# Patient Record
Sex: MALE | Race: WHITE | NOT HISPANIC OR LATINO | Employment: OTHER | URBAN - METROPOLITAN AREA
[De-identification: names, ages, dates, MRNs, and addresses within clinical notes are randomized per-mention and may not be internally consistent; named-entity substitution may affect disease eponyms.]

---

## 2023-08-30 ENCOUNTER — HOSPITAL ENCOUNTER (INPATIENT)
Facility: HOSPITAL | Age: 83
LOS: 2 days | Discharge: HOME | DRG: 057 | End: 2023-09-01
Attending: PHYSICAL MEDICINE & REHABILITATION | Admitting: PHYSICAL MEDICINE & REHABILITATION
Payer: MEDICARE

## 2023-08-30 DIAGNOSIS — I63.512 ACUTE ISCHEMIC LEFT MCA STROKE: Primary | ICD-10-CM

## 2023-08-30 RX ORDER — ASPIRIN 81 MG/1
81 TABLET, CHEWABLE ORAL DAILY
COMMUNITY

## 2023-08-30 RX ORDER — ALPRAZOLAM 0.5 MG/1
0.5 TABLET ORAL DAILY PRN
COMMUNITY

## 2023-08-30 RX ORDER — FUROSEMIDE 40 MG/1
40 TABLET ORAL DAILY
COMMUNITY

## 2023-08-30 RX ORDER — ATORVASTATIN CALCIUM 40 MG/1
40 TABLET, FILM COATED ORAL DAILY
Status: ON HOLD | COMMUNITY
End: 2023-09-01 | Stop reason: SDUPTHER

## 2023-08-30 RX ORDER — CLOPIDOGREL BISULFATE 75 MG/1
75 TABLET ORAL
Status: DISCONTINUED | OUTPATIENT
Start: 2023-08-31 | End: 2023-09-01 | Stop reason: HOSPADM

## 2023-08-30 RX ORDER — DIPHENOXYLATE HYDROCHLORIDE AND ATROPINE SULFATE 2.5; .025 MG/1; MG/1
1 TABLET ORAL
Status: DISCONTINUED | OUTPATIENT
Start: 2023-08-31 | End: 2023-09-01 | Stop reason: HOSPADM

## 2023-08-30 RX ORDER — FUROSEMIDE 40 MG/1
40 TABLET ORAL
Status: DISCONTINUED | OUTPATIENT
Start: 2023-08-31 | End: 2023-09-01 | Stop reason: HOSPADM

## 2023-08-30 RX ORDER — ATORVASTATIN CALCIUM 80 MG/1
80 TABLET, FILM COATED ORAL
Status: DISCONTINUED | OUTPATIENT
Start: 2023-08-31 | End: 2023-09-01 | Stop reason: HOSPADM

## 2023-08-30 RX ORDER — ALPRAZOLAM 0.5 MG/1
0.25 TABLET ORAL NIGHTLY PRN
Status: DISCONTINUED | OUTPATIENT
Start: 2023-08-30 | End: 2023-09-01

## 2023-08-30 RX ORDER — AMLODIPINE BESYLATE 5 MG/1
5 TABLET ORAL
Status: DISCONTINUED | OUTPATIENT
Start: 2023-08-31 | End: 2023-09-01 | Stop reason: HOSPADM

## 2023-08-30 RX ORDER — CLOPIDOGREL BISULFATE 75 MG/1
75 TABLET ORAL DAILY
COMMUNITY

## 2023-08-30 RX ORDER — ASPIRIN 81 MG/1
81 TABLET ORAL
Status: DISCONTINUED | OUTPATIENT
Start: 2023-08-31 | End: 2023-09-01 | Stop reason: HOSPADM

## 2023-08-30 RX ORDER — GUAIFENESIN 200 MG/10ML
200 LIQUID ORAL EVERY 4 HOURS PRN
Status: DISCONTINUED | OUTPATIENT
Start: 2023-08-30 | End: 2023-09-01 | Stop reason: HOSPADM

## 2023-08-30 RX ADMIN — METOPROLOL TARTRATE 12.5 MG: 25 TABLET, FILM COATED ORAL at 21:21

## 2023-08-30 RX ADMIN — GUAIFENESIN 200 MG: 100 SOLUTION ORAL at 21:26

## 2023-08-30 RX ADMIN — ALPRAZOLAM 0.25 MG: 0.5 TABLET ORAL at 21:21

## 2023-08-30 NOTE — PROGRESS NOTES
Inpatient Rehabilitation Plan of Care Note    Plan of Care  Care Plan Reviewed - Updates as Follows    Sphincter Control    [RN] Bowel Management(Active)  Current Status(08/31/2023): continent of bowel 100 %  Weekly Goal(09/07/2023): continent 100 %  Discharge Goal: continent 100%    Signed by: Sergo Dotson RN

## 2023-08-30 NOTE — PLAN OF CARE
Goal Outcome Evaluation:  Plan of Care Reviewed With: patient, spouse, family        Progress: improving

## 2023-08-30 NOTE — DISCHARGE INSTRUCTIONS
30-day cardiac monitor at discharge through [facility]    - follow-up provider order for Dr. Eric Elizabeth-[facility]-for 30-day event monitor per discharging physicians from [facility].  - follow-up with Dr. Eric Joya-physical medicine and rehabilitation-as needed for any rehab issues.  -follow-up provider-[facility]    No driving.  No alcohol.  Patient will need to follow-up with [facility] regarding any clearance for driving    Outpatient speech therapy [facility]

## 2023-08-30 NOTE — PROGRESS NOTES
SECTION GG    Eating Performance: Patient completed the activities by themself with no  assistance from a helper.    Eating Discharge Goals: Patient completed the activities by themself with no  assistance from a helper.    Section B. Hearing and Vision  Ability to Hear:  Minimal difficulty - difficulty in some environments (e.g.,  when person speaks softly or setting is noisy)  Ability to See in Adequate Light:  Impaired - sees large print, but not regular  print in newspapers/books    Section B. Health Literacy  Frequency of Needing Assistance Reading:  Never    Section D. Mood  Presence of little interest or pleasure in doing things:   No  Frequency of having little interest or pleasure in doing things:   Never or 1  day  Presence of feeling down, depressed, or hopeless:   No  Frequency of feeling down, depressed, or hopeless:   Never or 1 day   Interview Ended. Above responses do not meet criteria to continue.  Total Severity Score:   0    Section D. Social Isolation  Frequecy of Feeling Lonely or Isolated:  Rarely    Section J. Health Conditions (Pain Effect on Sleep)  Pain Effect on Sleep:   Rarely or not at all    Signed by: Sergo Dotson RN

## 2023-08-30 NOTE — H&P
[facility]   HISTORY AND PHYSICAL    Patient Name: Colin Connor  : 1940  MRN: [id number]  Primary Care Physician:  Riki Isabel MD  Date of admission: 2023    Subjective   Subjective     Chief Complaint: Status post left frontal CVA    History of Present Illness  Patient is 82-year-old male who presented to [facility] with right-sided weakness and aphasia on 2023.  Also had right facial drooping.  Garbled speech.  Acute left internal carotid artery occlusion secondary to ruptured internal carotid artery plaque and left MCA thrombus and occlusion.  He received tPA.  He underwent emergent endovascular mechanical thrombectomy of the left internal carotid artery and middle cerebral artery and left carotid artery angioplasty and stenting.  He is to continue aspirin Plavix and statin for stroke prophylaxis.  For hypertension continued amlodipine and Lopressor.  Hyperlipidemia-high intensity statin.  Chronic diastolic congestive heart failure..  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  Continues on Lasix 40 mg daily.  History of aortic valve replacement  with perioperative atrial fibrillation.  Remained in normal sinus rhythm during his recent hospital stay.    He has had mild aphasia.  Moving his extremities.  With speech therapy-[Aphasia Diagnosis Mild expressive deficits;Mild receptive deficits   Apraxia Diagnosis Verbal apraxia;Mild apraxia   Dysarthria Diagnosis Mild dysarthria   Tolerance to Treatment Patient tolerated treatment well   Problem List Impaired Intelligibility/speech;Impaired communication;Impaired comprehension;Impaired language   Comment Patient tolerated treatment well. Improvement in expressive language for conversation and verbal descriptions this date. No initial phrase repetitions or difficulty with initiation observed. Semantic paraphasias x2 corrected with cues to make inference about scene. Perseveration of one item x1 from previous scene. Suspect visual scenes description  and reading accuracy impacted slightly by visual acuity changes from patient's macular degeneration. When paragraph presented in larger font phonemic errors were still noted. Required cues to slow rate of reading and focus on individual words to increase reading accuracy. Family and patient report overall improvement in articulation however slight reduction of articulatory precision still noted. Benefited from cues for over articulation when producing alliterative phrases. ]    With physical therapy-[Bed Mobility/Transfers   Sit to Stand Supervision/Standby assist   Stand to Sit Supervision/Standby assist   Technique Stand pivot   Trials/Comments 5xSTS: 17s, SBA     Ambulation   Ambulation Assistance Contact guard;Supervision/Standby assist;With cues   Quality of Gait Decreased ramses;Narrow base of support;Cues for increased hip/knee flexion;Cues for step through;Decreased arm swing   Loss of Balance (# of times) 1 times   Distance Ambulated (ft) -- 40' intervals, multiple reps, c focus on increased scanning, stops/starts and route-finding   Ambulation Safety Fair   Gait Comment TUs ]  With Occupational Therapy-[Bed Mobility/Transfers   Supine to Sit Supervision/Standby assist;With side rail/trapeze   Sit to Stand Supervision/Standby assist   Stand to Sit Supervision/Standby assist   Functional Mobility Supervision/Standby assist   Transfer Device -- gait belt   Trials/Comments Pt completing transfer from bed to chair, pt with mild unsteadiness     Balance   Static Sitting Good   Dynamic Sitting Good   Static Standing Fair   Dynamic Standing Fair ]    Given his functional impairments and comorbidities he is now admitted for acute inpatient rehabilitation    He denies any cognitive changes.  Has some slurring of his speech.  Denies any significant dysphagia.  Does have some cough, nonproductive.  No breathing complaints.  No abdominal complaints.  No bowel complaints.  No bladder complaints.  Denies any  premorbid functional limitations.  Was independent without a device.    Review of Systems     Personal History     Past Medical History:   Diagnosis Date    CHF (congestive heart failure)     Coronary artery disease     Elevated cholesterol     Hypertension     Sleep apnea     Stroke      Past Medical History:  Past Medical History:   Diagnosis Date    Anxiety    Atrial fibrillation    Benign essential HTN    CHF (congestive heart failure)    Coronary artery disease    Exercise intolerance   short of breath    GERD (gastroesophageal reflux disease)   no meds    Glaucoma    Kidney stones    Macular degeneration    Myocardial infarction 1991   stent placement    Nonsenile cataract   removed bilaterally    Sleep apnea    Stroke 2006   peripheral vision diminished left eye    Valvular heart disease    Wears dentures   full upper, partial bottom     Past Surgical History:  Past Surgical History:   Procedure Laterality Date    CARDIAC CATHETERIZATION    CATARACT EXTRACTION, BILATERAL    DRUG ELUTING STENT PLACEMENT 02/14/2014   Xience 4.0 x15, 4.0 x 18, 3.5 x 38, 3.0 x 38 in RCA    EYE SURGERY   Past Surgical History:   Procedure Laterality Date    CARDIAC CATHETERIZATION      CARDIAC SURGERY      EYE SURGERY         Family History: family history includes Cancer in his sister; Heart failure in his brother; Macular degeneration in his mother; No Known Problems in his daughter, maternal aunt, maternal grandfather, maternal grandmother, maternal uncle, paternal aunt, paternal grandfather, paternal grandmother, paternal uncle, son, and another family member. Otherwise pertinent FHx was reviewed and not pertinent to current issue.    Social History:  reports that he quit smoking about 32 years ago. His smoking use included cigarettes. He started smoking about 69 years ago. He has a 18.50 pack-year smoking history. He has never used smokeless tobacco. Alcohol use questions deferred to the physician. He reports that he does not  use drugs.  .  3 steps into the home.  Half bath on the first floor.  Full bath and bedroom on the second floor.  If needed he could set up a bed on the first floor.  Retired.  Home Medications:  ALPRAZolam, Multiple Vitamins-Minerals, aspirin, atorvastatin, clopidogrel, furosemide, and metoprolol tartrate    Allergies:  ACE inhibitor's-cough      Medications:  Scheduled Meds:[START ON 8/31/2023] amLODIPine, 5 mg, Oral, Q24H  [START ON 8/31/2023] aspirin, 81 mg, Oral, Q24H  [START ON 8/31/2023] atorvastatin, 80 mg, Oral, Q24H  [START ON 8/31/2023] clopidogrel, 75 mg, Oral, Q24H  [START ON 8/31/2023] furosemide, 40 mg, Oral, Q24H  metoprolol tartrate, 12.5 mg, Oral, Q12H  [START ON 8/31/2023] multivitamin, 1 tablet, Oral, Q24H      Continuous Infusions:   PRN Meds:.  ALPRAZolam    Objective    Objective     Vitals:   Temp:  [98.2 °F (36.8 °C)] 98.2 °F (36.8 °C)  Heart Rate:  [72] 72  Resp:  [18] 18  BP: (163)/(74) 163/74    Physical Exam    MENTAL STATUS -  AWAKE / ALERT  HEENT- NCAT, PUPILS EQUALLY ROUND, SCLERAE ANICTERIC, CONJUNCTIVAE PINK, OP MOIST, NO JVD, EARS UNREMARKABLE EXTERNALLY  LUNGS - CTA, NO WHEEZES, RALES OR RHONCHI  HEART- RRR with occasional skipped beat, NO RUB, MURMUR, OR GALLOP  ABD - NORMOACTIVE BOWEL SOUNDS, SOFT, NT. NO HEPATOSPLENOMEGALY APPRECIATED  EXT - NO EDEMA OR CYANOSIS  NEURO -oriented person place time and situation.  Able to do simple time management.  He had difficulty with simple money management.  Can spell his last name forward but not backward.  Speech was fluent  Recognizes finger movement all 4 quadrants with each eye tested individually.  Mild right facial droop  Dysarthria  MOTOR EXAM -patient resistance well with bilateral shoulder flexion, elbow flexion, left finger flexion with mild weakness with right finger flexion, and takes resistance with bilateral knee extension and ankle dorsiflexion.  Impaired dexterity bilaterally  Difficulty with rapid alternating  movements  Tone was normal      Result Review    Result Review:     Component  Ref Range & Units Today Comments   Sodium  136 - 145 mmol/L 141 (note)  Excess protein and/or lipids can falsely decrease sodium levels  (pseudo hyponatremia).   Potassium  3.5 - 5.1 mmol/L 4.0 Falsely elevated potassium can occur in patients with high WBC or platelet counts.   Chloride  98 - 107 mmol/L 107 (note)  Falsely elevated chloride levels can be seen in patients taking  medications which contain bromide.   Carbon Dioxide  22 - 29 mmol/L 27    Anion Gap  5 - 13 (arb'U) 7 (note)  Calculation- Na - (Cl + CO2)   Glucose  71 - 139 mg/dL 101 (note)  Reference range based on inpatient hypo/hyperglycemic treatment  levels. A random glucose =/>200 is concerning for poor control.   Blood Urea Nitrogen (BUN)  8 - 26 mg/dL 13    Creatinine-Blood  0.73 - 1.18 mg/dL 1.02    BUN/Creatinine Ratio  RATIO 12.7    Estimated GFR (Cr)  >60 /1.73 m2 73 (note)  eGFR calculated based on IDMS traceable, enzymatic creatinine  method using the CKD-EPI 2021 equation.   Total Protein  6.2 - 8.0 g/dL 6.3    Albumin  3.2 - 4.6 g/dL 3.6    Globulin  1.5 - 4.5 g/dL 2.7    Albumin/Globulin Ratio  1.1 - 2.5 RATIO 1.3    Calcium  8.4 - 10.2 mg/dL 9.1    Total Bilirubin  0.2 - 1.2 mg/dL 0.9    AST/SGOT  5 - 34 U/L 21    ALT/SGPT  0 - 55 U/L 20    Alkaline Phosphatase  40 - 150 U/L 112    Resulting Agency [facility]    Specimen Collected: 08/30/23 04:40 Last Resulted: 08/30/23 05:13   Received From: [facility]  Result Received: 08/30/23 17:23     Component  Ref Range & Units 6 d ago Comments   Triglycerides  0 - 149 mg/dL 120 (note)  Triglyceride levels (in mg/dL):  Normal (0-9yrs: <75, 10-19yrs: <90, >19yrs: <150),  Borderline (0-9yrs: 75-99, 10-19yrs: , >19yrs: 150-199),  High/very high (0-9yrs: >99, 10-19yrs: >129, >19yrs: >200)   Cholesterol  0 - 199 mg/dL 139 (note)  Cholesterol levels (in mg/dL):  Desirable <200 adults/<170  children,  Borderline-high: 200-239 adults/170-199 children,  High >239 adults/>199 children.   HDL Cholesterol  60 - 9999 mg/dL 33 Low     LDL Cholesterol-Calculated  0 - 100 mg/dL 82 (note)  LDL levels (in mg/dL):  Optimal <100,  Near optimal/above optimal 100-129,  Borderline High 130-159,  High 160-189, or  Very High >189   VLDL  0 - 30 mg/dL 24    CHOL/HDL Ratio  RATIO 4.2    Is patient fasting?  (arb'U) Yes    Resulting Agency [facility]    Specimen Collected: 08/24/23 02:32 Last Resulted: 08/24/23 03:08   Received From: [facility]  Result Received: 08/30/23 17:23     Component  Ref Range & Units 6 d ago   White Blood Count  4.5 - 11.0 10*3/uL 10.03   Red Blood Count  4.5 - 5.9 10*6/uL 4.98   Hemoglobin  13.5 - 17.5 g/dL 15.6   Hematocrit  41.0 - 53.0 % 46.6   Mean Corpuscular Volume  80.0 - 100.0 fL 93.6   Mean Corpuscular Hemoglobin  26.0 - 34.0 pg 31.3   Mean Corpuscular HGB Conc  31.0 - 37.0 g/dL 33.5   Red Cell Distribution Width-CV  12.0 - 16.8 % 13.1   Platelet Count  140 - 440 10*3/uL 113 Low    Mean Platelet Volume  8.4 - 12.4 fL 11.8   Diff Type  (arb'U) Hospital CBC w/AutoDiff   Neutrophils %  45 - 80 % 71.8   Lymphocyte %  15 - 50 % 17.1   Monocyte %  0 - 15 % 9.5   Eosinophil%  0 - 7 % 1.1   BASO%  0 - 2 % 0.2   Immature Granulocyte%  0.0 - 1.0 % 0.3   Nucleated RBC %  0 /100(WBC) 0   Neutrophil Abs  2.0 - 8.8 10*3/uL 7.20   Lymphocyte-Absolute  0.7 - 5.5 10*3/uL 1.72   Monocyte Absolute  0.0 - 1.7 10*3/uL 0.95   EOS-Absolute  0.0 - 0.8 10*3/uL 0.11   Basophil Abs  0.0 - 0.2 10*3/uL 0.02   Immature Granulocyte Abs  0.00 - 0.10 10*3/uL 0.03   Resulting Agency [facility]   Specimen Collected: 08/24/23 01:32 Last Resulted: 08/24/23 02:01   Received From: [facility]  Result Received: 08/30/23 17:23     Platelet Function Aspirin  (arb'U) 531 (note)  Aspirin Reaction Units (ARU) indicate the degree of  platelet dysfunction associated with Aspirin (ie. Decreased  Platelet  Aggregation).    Result less than 550 ARU indicate Platelet dysfunction  consistent with Aspirin effect.  Result greater than or  equal to 550 ARU may indicate Aspirin resistance,  non-compliance, or non-medicated patient.    Patients receiving GPIIb/IIIa inhibitors.   Dipyridamole,  P2Y12 inhibitors and NSAIDs such as ibuprofen and naproxen  should NOT be tested with the Aspirin Response Assay due to  known interferences with test results.   Resulting Agency [facility]    Specimen Collected: 08/24/23 01:32 Last Resulted: 08/24/23 02:35   Received From: [facility]  Result Received: 08/30/23 17:23     P2Y12 Reaction Units-Plavix  (PRU) 155 (note)  Interpretive Comment for Plavix (P2Y12)  Response Test:    Therapeutic Range 0-193 PRU    Pre-Surgical Range 237-250 PRU     Ref Range & Units 6 d ago Comments   Hemoglobin A1C  4.3 - 5.6 % 5.9 High       Ref Range & Units 6 d ago   Thyroid Stimulating Hormone  0.350 - 4.940 m(iU)/L 1.480     Holter Monitor Placement August 25, 2023    Impression    MONITOR REPORT    Sinus rhythm minimum heart rate 47 bpm.  Maximum heart rate 90 bpm.  There are rare single premature ventricular complexes.  There are rare single premature supraventricular complexes.  There is 1 6 beat episode of nonsustained atrial tachycardia.  No sustained tachycardia or bradycardia arrhythmias.  No diary submitted    Echocardiogram August 24, 2023  Summary:                 The ejection fraction biplane was calculated at 63%.                            The left ventricular chamber size, and systolic function are within normal limits. There are no regional wall                            motion abnormalities observed.                            Moderate concentric left ventricular hypertrophy.                            Intravenous agitated saline contrast was used to assess intracardiac shunting.                            There is no evidence of intra-atrial shunting by agitated saline  injections.                            s/p AVR 23MM magna ease valve per hx                            The peak instantaneous gradient of the aortic valve is 33.6 mmHg. The mean gradient of the aortic valve                            is 19 mmHg.                            There is a mid cavitary gradient as well. mean gradient 19mm with post vasalva gradient 59 mm Hg                            Trace mitral regurgitation is present.                            Trace-to-mild tricuspid regurgitation.                            Right ventricular systolic pressure of 55 mmHg.   ===  EXAMINATION: CT PERFUSION STROKE PROTOCOL, BRAIN, CTA STROKE PROTOCOL   - NECK, CTA STROKE PROTOCOL - HEAD   EXAM DATE: 8/23/2023 3:16 PM      CLINICAL HISTORY: Aphasia and right-sided neglect.     COMPARISONS: Same day CT head. CTA neck May 5, 2021     TECHNIQUE: Routine protocol CTA imaging of the head and neck was   performed with intravenous contrast. 3-D reformations were performed   by the CT technologist on a workstation and provided for   interpretation.  Following the administration of intravenous contrast,   routine dynamic contrast-enhanced CT perfusion imaging of the brain   was performed.  Automated image processing software (RAPID) was   utilized to generate parametric maps (CBF, CBV, Tmax, MTT). RAPID was   also used to segment regions of thresholded infarct core and   thresholded critical hypoperfusion. Mismatch volume and mismatch ratio   were calculated. NASCET criteria was used to calculate percentage of   stenosis. Dose reduction technique was utilized per ALARA protocol.      _________________________     CTA NECK FINDINGS:     AORTIC ARCH:   There is a left-sided, three vessel aortic arch with conventional   branching anatomy.  The innominate and subclavian arteries are patent   without high grade stenosis.     RIGHT COMMON AND INTERNAL CAROTID ARTERIES:   There is a retropharyngeal course of the right common carotid  artery.   There has been interval placement of a right common-internal carotid   artery since the prior exam, which is patent. A background of   calcified and noncalcified atherosclerotic plaque at the right carotid   bifurcation results in, at most, mild in-stent stenosis.     LEFT COMMON AND INTERNAL CAROTID ARTERIES:   The left common carotid artery is patent without significant stenosis.   There is occlusion of the left internal carotid artery at the level of   the bifurcation with thready opacification of the petrous and   supraclinoid segments and more robust distal reconstitution at the   level of the left carotid terminus.     RIGHT VERTEBRAL ARTERY:   The right vertebral artery arises from the right subclavian artery.   The cervical right vertebral artery is patent without significant   stenosis.     LEFT VERTEBRAL ARTERY:   The left vertebral artery arises from the left subclavian artery. The   cervical left vertebral artery is patent without significant stenosis.     OTHER:   There is no acute soft tissue abnormality in the neck. There are   multilevel degenerative changes of the cervical vertebral column. Mild   centrilobular emphysema. Unchanged small left upper lobe pulmonary   nodule. The patient is edentulous.   _________________________     CTA HEAD FINDINGS:     RIGHT ANTERIOR CIRCULATION:   There is atherosclerotic calcification along the intracranial right   internal carotid artery resulting in moderate stenosis at the junction   of the petrous and cavernous segments. The right middle cerebral   artery M1 and proximal M2 segments are patent. The right anterior   cerebral artery A1 and A2 segments are patent.      LEFT ANTERIOR CIRCULATION:   There is occlusion of the left internal carotid artery at the level of   the bifurcation with thready opacification of the petrous and   supraclinoid segments and more robust distal reconstitution at the   level of the left carotid terminus. There is a  nonocclusive filling   defect in the M1 segment of the left middle cerebral artery with   normal opacification of the distal M1 segment and M2 branches. The   left anterior cerebral artery A1 and A2 segments are patent.      POSTERIOR CIRCULATION:   The intracranial vertebral arteries and basilar artery are patent   without high grade stenosis. There is no proximal occlusion or high   grade stenosis of the superior cerebellar arteries. There is focal   moderate short segment stenosis of the P2 segments of the bilateral   posterior cerebral arteries.     OTHER:   There is a laterally directed saccular outpouching of the cavernous   segment of the right internal carotid artery with a neck measurement   of 3 mm and base to dome measurement of 2 mm. No definite   space-occupying intracranial mass lesion, hydrocephalus, mass effect,   or midline shift.     _____     RAPID Analysis:   There is an area of diminished cerebral blood flow in the left frontal   lobe meeting threshold criteria for infarct core with a larger   surrounding area of elevated TMax throughout the left cerebral   hemisphere meeting threshold criteria for an area of critical   hypoperfusion. Both of these areas of altered cerebrovascular   physiology are located in a distribution of the left middle cerebral   artery.     Infarct core volume (CBF < 30%): 34 mL   Critical hypoperfusion volume (Tmax > 6 sec): 192 mL   Mismatch volume: 158 mL   Mismatch ratio: 5.6     _________________________     IMPRESSION:     1.CT perfusion evidence of acute ischemic injury in a distribution of   the left middle cerebral artery, with a left frontal infarct core and   large surrounding left cerebral ischemic penumbra, as detailed above.   2.Occlusion of the left internal carotid artery at the level of the   bifurcation with thready opacification of the intracranial segments   and more robust distal reconstitution at the left carotid terminus.   3.Nonocclusive thrombus  in the proximal M1 segment of the left MCA   with normal opacification of the distal M1 and arborizing left MCA.   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   5.Right common-internal carotid artery stent with mild in-stent   stenosis.   6.Intracranial and extracranial atherosclerotic disease resulting in   moderate stenosis at the petrous/cavernous segment junction of the   right ICA and P2 segments of the bilateral PCAs.     The above findings were communicated with Mae Quintero M.D. by Manny Holley M.D. at 8/23/2023 3:10 PM.     MRI of the brain August 24, 2023  MRI of the brain with and without contrast,     08/24/2023 at 0402 hours     HISTORY: Left middle cerebral artery thrombectomy.  Left middle   cerebral artery thrombus     TECHNIQUE: Multiplanar and multisequence imaging was obtained of the   brain on a 3 Trixie magnet. Images were obtained pre-and-post   administration of IV gadolinium.     COMPARISON: CT of the head, CT PE, and CTA of the head performed on   August 23, 2023     FINDINGS:     BRAIN: Subtle restricted diffusion is seen involving the left frontal   cortex, left insular ribbon, and left basal ganglia. No corresponding   hemorrhage is demonstrated. A tiny area of restricted diffusion is   seen in the left parietal cortex.     A chronic infarct is seen in the right occipital lobe. There is ex   vacuo dilatation of the right occipital horn. Small chronic infarcts   are seen in the right thalamus and right cerebellar hemisphere.     There is no evidence of hydrocephalus or mass-effect. Mild   periventricular T2 and FLAIR signals abnormality is demonstrated. Flow   is demonstrated within the vertebrobasilar and carotid arteries. There   is no evidence of pathologic contrast enhancement.     Bilateral lens extractions have been performed. The orbits and   pituitary gland are within normal limits. There is no evidence of   Chiari malformation or syrinx. The  paranasal sinuses and mastoid air   cells are clear.     IMPRESSION:   1. Subtle restricted diffusion is seen in the left frontal cortex,   left insular ribbon and left basal ganglia. This may represent   ischemia rather than infarct.   2. Tiny acute infarcts seen in the left parietal cortex.   3. Chronic infarcts are seen in the right occipital lobe, right   thalamus and right cerebellar hemisphere.         Assessment & Plan   Assessment / Plan     Brief Patient Summary:  Colin Connor is a 82 y.o. male who status post left frontal CVA transferred from [facility] for acute inpatient rehabilitation    Active Hospital Problems:  Active Hospital Problems    Diagnosis     **Acute ischemic left MCA stroke      Status post left frontal CVA August 23, 2023  Status post tPA.  Status post left middle cerebral artery thrombectomy using stent and retriever device.  Left internal carotid artery thrombectomy using aspiration/suction and left carotid artery angioplasty and stenting August 23, 2023    Old chronic right occipital lobe infarct.  Small chronic infarcts right thalamus and right cerebellar hemisphere.  Occlusion of left internal carotid at the level of the bifurcation  Right common-internal carotid artery stent    Impaired mobility  Impaired self-care    Dysarthria    Baseline left visual field deficit from prior stroke    Aortic valve replacement-23 mm Magna Ease valve  Cardiac-coronary artery disease status post stent.  Atrial fibrillation.  Congestive heart failure.  Furosemide/metoprolol/amlodipine    30-day cardiac monitor at discharge    Stroke prophylaxis-aspirin/Plavix/atorvastatin    Anxiety-alprazolam as needed.  Randall report reviewed    DVT prophylaxis-on dual antiplatelet therapy.  SCDs.              Now admit for comprehensive acute inpatient rehabilitation .  This would be an interdisciplinary program with physical therapy 1 hour,  occupational therapy 1 hour, and speech therapy 1 hour, 5 days a  week.  Rehabilitation nursing for carryover, monitoring of neurologic   status, bowel and bladder, and skin  Ongoing physician follow-up.  Weekly team conferences.  Goals are supervision standby assist.  Rehabilitation prognosis fair.  Medical prognosis fair.  Estimated length of stay is 2 weeks, but is only an estimation.   The patient's functional status and clinical status is unchanged from preadmission assessment and the patient continues appropriate for acute inpatient rehabilitation.  Goal is for home with home health   therapies.  Barrier to discharge:.  Mobility and self-care- work on, transfers, progressive ambulation, ADLs to overcome.       CODE STATUS:       Admission Status:  I believe this patient meets inpatient status.    Eric Joya MD

## 2023-08-31 PROCEDURE — 96125 COGNITIVE TEST BY HC PRO: CPT

## 2023-08-31 PROCEDURE — 97110 THERAPEUTIC EXERCISES: CPT

## 2023-08-31 PROCEDURE — 97530 THERAPEUTIC ACTIVITIES: CPT

## 2023-08-31 PROCEDURE — 97166 OT EVAL MOD COMPLEX 45 MIN: CPT

## 2023-08-31 PROCEDURE — 97535 SELF CARE MNGMENT TRAINING: CPT

## 2023-08-31 PROCEDURE — 97161 PT EVAL LOW COMPLEX 20 MIN: CPT

## 2023-08-31 PROCEDURE — 97116 GAIT TRAINING THERAPY: CPT

## 2023-08-31 RX ADMIN — CLOPIDOGREL BISULFATE 75 MG: 75 TABLET, FILM COATED ORAL at 08:42

## 2023-08-31 RX ADMIN — ASPIRIN 81 MG: 81 TABLET, COATED ORAL at 08:42

## 2023-08-31 RX ADMIN — METOPROLOL TARTRATE 12.5 MG: 25 TABLET, FILM COATED ORAL at 20:32

## 2023-08-31 RX ADMIN — METOPROLOL TARTRATE 12.5 MG: 25 TABLET, FILM COATED ORAL at 08:42

## 2023-08-31 RX ADMIN — AMLODIPINE BESYLATE 5 MG: 5 TABLET ORAL at 08:42

## 2023-08-31 RX ADMIN — FUROSEMIDE 40 MG: 40 TABLET ORAL at 08:42

## 2023-08-31 RX ADMIN — Medication 1 TABLET: at 08:42

## 2023-08-31 RX ADMIN — ATORVASTATIN CALCIUM 80 MG: 80 TABLET, FILM COATED ORAL at 08:42

## 2023-08-31 NOTE — PROGRESS NOTES
Discharge Planning Assessment  [facility]     Patient Name: Colin Connor  MRN: [id number]  Today's Date: 8/31/2023    Admit Date: 8/30/2023    Plan: Patient will d/c home with wife. Will have intermittent assistance if needed from adult daughter and sons who live close. Will arrange outpatient therapy as needed.   Discharge Needs Assessment    No documentation.                  Discharge Plan       Row Name 08/31/23 1610       Plan    Plan Patient will d/c home with wife. Will have intermittent assistance if needed from adult daughter and sons who live close. Will arrange outpatient therapy as needed.    Patient/Family in Agreement with Plan yes    Plan Comments Completed assessment with patient, wife and daughter. Patient lives with wife in 2 story home with bedroom and full bath on second floor. Has flight of steps up to second floor. Half bath on first floor. 4 steps to enter home with rails. Patient was independent and active prior to hospitalization. He was driving and helps with household chores. He manages his own medications. D/C plan is home with wife. She feels she will be able to assist patient as needed. They have daughter and 2 sons who live close to them and will assist as needed. Patient feels he is doing well and hoping he will have short stay on rehab. Discussed SW role, team and family conference. Discussed possible outpatient therapy after d/c. Will assist with plans.                  Continued Care and Services - Admitted Since 8/30/2023    Coordination has not been started for this encounter.          Demographic Summary    No documentation.                  Functional Status       Row Name 08/31/23 1600       Functional Status    Usual Activity Tolerance good    Current Activity Tolerance moderate    Functional Status Comments Patient was independent at home with mobility and self care. Did not use any DME       Functional Status, IADL    Medications independent    Meal Preparation  independent;assistive person    Housekeeping independent;assistive person    Laundry assistive person    Shopping assistive person    IADL Comments Patient stated he helps with cleaning but wife does most of other household chores. Patient drives.       Mental Status    General Appearance WDL WDL       Mental Status Summary    Recent Changes in Mental Status/Cognitive Functioning no changes    Mental Status Comments Patient and family deny any changes.       Employment/    Employment Status retired    Current or Previous Occupation factory work    Employment/ Comments Patient worked as  for 48 years at [employer]       Row Name 08/31/23 1944       Values/Beliefs    Spiritual, Cultural Beliefs, Yarsani Practices, Values that Affect Care no       Behavior WDL    Behavior WDL interactions    Interactions appropriate to situation;cooperative;eye contact appropriate       Emotion Mood WDL    Emotion/Mood/Affect WDL emotion mood    Emotion/Mood appropriate to situation       Speech WDL    Speech WDL WDL       Perceptual State WDL    Perceptual State WDL WDL       Thought Process WDL    Thought Process WDL WDL       Intellectual Performance WDL    Intellectual Performance WDL WDL       Coping/Stress    Major Change/Loss/Stressor medical condition/diagnosis;loss of independence    Patient Personal Strengths assertive;expressive of emotions;expressive of needs;self-reliant;positive attitude;motivated;strong support system;successful coping history    Sources of Support adult child(inés);spouse    Techniques to [place] with Loss/Stress/Change diversional activities    Reaction to Health Status adjusting;hopeful;motivated    Understanding of Condition and Treatment adequate understanding of medical condition;adequate understanding of treatment    Coping/Stress Comments Patient feels he is coping well and ready to go home.       Developmental Stage (Eriksson's)     Developmental Stage Stage 8 (65 years-death/Late Adulthood) Integrity vs. Despair                   Abuse/Neglect    No documentation.                  Legal       Row Name 08/31/23 2436       Financial/Legal    Source of Income social security    Who Manages Finances if Patient Unable Wife    Finance Comments No POA                   Substance Abuse    No documentation.                  Patient Forms    No documentation.                     OJSHUA Eli

## 2023-08-31 NOTE — PROGRESS NOTES
Inpatient Rehabilitation Functional Measures Assessment and Plan of Care    Plan of Care  Updated Problems/Interventions  Mobility    [OT] Toilet Transfers(Active)  Current Status(08/31/2023): CGA  Weekly Goal(09/07/2023): SBA  Discharge Goal: Supervision    [OT] Tub/Shower Transfers(Active)  Current Status(08/31/2023): Anticipate CGA  Weekly Goal(09/07/2023): SBA  Discharge Goal: Supervision        Self Care    [OT] Bathing(Active)  Current Status(08/31/2023): Min A  Weekly Goal(09/07/2023): CGA  Discharge Goal: SBA    [OT] Dressing (Lower)(Active)  Current Status(08/31/2023): Min A  Weekly Goal(09/07/2023): CGA  Discharge Goal: SBA    [OT] Dressing (Upper)(Active)  Current Status(08/31/2023): SBA  Weekly Goal(09/07/2023): Set up  Discharge Goal: Set up    [OT] Grooming(Active)  Current Status(08/31/2023): SBA  Weekly Goal(09/07/2023): Set up  Discharge Goal: Set up    [OT] Toileting(Active)  Current Status(08/31/2023): Min A  Weekly Goal(09/07/2023): CGA  Discharge Goal: SBA    Functional Measures  SONNY Eating:  Branch  SONNY Grooming: Branch  SONNY Bathing:  Branch  SONNY Upper Body Dressing:  Branch  SONNY Lower Body Dressing:  Branch  SONNY Toileting:  Branch    SONNY Bladder Management  Level of Assistance:  Branch  Frequency/Number of Accidents this Shift:  Branch    SONNY Bowel Management  Level of Assistance: Branch  Frequency/Number of Accidents this Shift: Branch    SONNY Bed/Chair/Wheelchair Transfer:  Branch  SONNY Toilet Transfer:  Branch  SONYN Tub/Shower Transfer:  Branch    Previously Documented Mode of Locomotion at Discharge: Field  SONNY Expected Mode of Locomotion at Discharge: Branch  SONNY Walk/Wheelchair:  Branch  SONNY Stairs:  Branch    SONNY Comprehension:  Branch  SONNY Expression:  Branch  SONNY Social Interaction:  Branch  SONNY Problem Solving:  Branch  SONNY Memory:  Branch    Therapy Mode Minutes  Occupational Therapy: Branch  Physical Therapy: Branch  Speech Language Pathology:  Branch    Signed by: Verenice Santos  OT

## 2023-08-31 NOTE — PLAN OF CARE
"Goal Outcome Evaluation:                 Cognitive linguistic evaluation completed in setting of L MCA CVA. Acute care evaluation at [facility] 8.23 with mild aphasia, apraxia, dysarthria. Pt underwent [facility] 8.24 recommending regular/thins. At time of evaluation, pt and spouse endorse no current concerns with speech, language, or cognition.     CLQT administered. Pt obtains an overall composite score of 2.2, indicative of \"moderate cognitive impairment.\" Subtests indicate: score of 1 or severe impairment for attention; score of 2 or moderate impairment for visuospatial skills and executive function; score of 3 or mild impairment for memory and language, score of 1 for severely impaired clock draw. Informally, semantic paraphasias x2 with pt aware, and can repair. No dysarthria appreciated - 100% intelligibility at conversational level with unfamiliar listener. Of note, baseline visual impairments (macular degeneration and decreased peripheral vision from prior CVA may slightly skew written and visuospatial task results).     Discussed results with pt, recommendations for cognitive tx, pt in agreement. Will follow x5 weekly, x1 hr daily for cognitive linguistic plan of therapy.          "

## 2023-08-31 NOTE — PROGRESS NOTES
Case Management  Inpatient Rehabilitation Plan of Care and Discharge Plan Note    Rehabilitation Diagnosis:  Branch  Date of Onset:  Gustavo    Medical Summary:  Gustavo  Past Medical History: Gustavo    Plan of Care  Updated Problems/Interventions  Field    Expected Intensity:  Gustavo  Interdisciplinary Team:  Gustavo  Estimated Length of Stay/Anticipated Discharge Date: Branch  Anticipated Discharge Destination:  Anticipated discharge destination from inpatient rehabilitation is community  discharge with assistance. Patient lives with wife in 2 story home with 4 step  entry. Bedroom/full bath on second floor  D/C plan is home with wife who is available 24/7 and can physically assist.  3  adult children live close and can assist if needed.      Based on the patient's medical and functional status, their prognosis and  expected level of functional improvement is:  Gustavo    Signed by: ELIESER Leogn

## 2023-08-31 NOTE — PLAN OF CARE
Problem: Rehabilitation (IRF) Plan of Care  Goal: Plan of Care Review  Outcome: Ongoing, Progressing  Flowsheets (Taken 8/31/2023 0348)  Progress: no change  Plan of Care Reviewed With: patient  Outcome Evaluation: Pt was a new admission on day shift from [facility], Pt admitted w/ acute ischemic L MCA stroke, pt is A&Ox2, disoriented to time and place, pt does have some dysarthria and aphasia, pt does have hx of L visual field cut from a stroke 2016, no new visual deficits, R side slightly weaker, skin intac, meds whole w/ water, up w/ asst x1, cont of B/B, BM 8/30, he is resting well tonight, spouse at bedside, plan of care ongoing.   Goal Outcome Evaluation:  Plan of Care Reviewed With: patient        Progress: no change  Outcome Evaluation: Pt was a new admission on day shift from [facility], Pt admitted w/ acute ischemic L MCA stroke, pt is A&Ox2, disoriented to time and place, pt does have some dysarthria and aphasia, pt does have hx of L visual field cut from a stroke 2016, no new visual deficits, R side slightly weaker, skin intac, meds whole w/ water, up w/ asst x1, cont of B/B, BM 8/30, he is resting well tonight, spouse at bedside, plan of care ongoing.

## 2023-08-31 NOTE — PROGRESS NOTES
SECTION GG    Self Care Performance:   Oral Hygiene: [place] provides verbal cues and/or touching/steadying and/or  contact guard assistance as patient completes activity.   Toileting Hygiene: [place] does less than half the effort. [place] lifts, holds  or supports trunk or limbs but provides less than half the effort.   Shower/Bathe Self: [place] does less than half the effort. [place] lifts, holds  or supports trunk or limbs but provides less than half the effort.   Upper Body Dressing: [place] provides verbal cues and/or touching/steadying  and/or contact guard assistance as patient completes activity.   Lower Body Dressing: [place] does less than half the effort. [place] lifts, holds  or supports trunk or limbs but provides less than half the effort.   Putting On/Taking Off Footwear: [place] does less than half the effort. [place]  lifts, holds or supports trunk or limbs but provides less than half the effort.    Self Care Discharge Goals:   Oral Hygiene: [place] sets up or cleans up; patient completes activity. [place]  assists only prior to or following the activity.   Toileting Hygiene: [place] provides verbal cues or touching/steadying assistance  as patient completes activity.   Shower/Bathe Self: [place] provides verbal cues or touching/steadying assistance  as patient completes activity.   Upper Body Dressing: Patient completed the activities by themself with no  assistance from a helper.   Lower Body Dressing: [place] provides verbal cues or touching/steadying  assistance as patient completes activity.   Putting On/Taking Off Footwear: [place] provides verbal cues or  touching/steadying assistance as patient completes activity.    Mobility Toilet Transfer Performance: [place] provides verbal cues or  touching/steadying assistance as patient completes activity.    Mobility Toilet Transfer Discharge Goal: [place] sets up or cleans up; patient  completes activity. [place] assists only prior to or following the  activity.    Signed by: Verenice Santos, OT

## 2023-08-31 NOTE — PROGRESS NOTES
Inpatient Rehabilitation Admission  Section A. Transportation  Issues Due to Lack of Transportation:   No    Signed by: ELIESER Leong

## 2023-08-31 NOTE — PROGRESS NOTES
Case Management  Inpatient Rehabilitation Plan of Care and Discharge Plan Note    Rehabilitation Diagnosis:  left frontal CVA- No Michael  Date of Onset:  08/23/2023    Medical Summary:  Patient is 82-year-old male who presented to [facility]  with right-sided weakness and aphasia on August 23, 2023.  Also had right facial  drooping.  Garbled speech.  Acute left internal carotid artery occlusion  secondary to ruptured internal carotid artery plaque and left MCA thrombus and  occlusion.  He received tPA.  He underwent emergent endovascular mechanical  thrombectomy of the left internal carotid artery and middle cerebral artery and  left carotid artery angioplasty and stenting.  He is to continue aspirin Plavix and statin for stroke prophylaxis.  For  hypertension continued amlodipine and Lopressor.  Hyperlipidemia-high intensity  statin.  Chronic diastolic congestive heart failure..  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  Continues  on Lasix 40 mg daily.  History of aortic valve replacement 2018 with  perioperative atrial fibrillation.  Remained in normal sinus rhythm during his  recent hospital stay.    He has had mild aphasia.  Moving his extremities.  With speech therapy-[Aphasia Diagnosis Mild expressive deficits;Mild receptive  deficits  Apraxia Diagnosis Verbal apraxia;Mild apraxia  Dysarthria Diagnosis Mild dysarthria  Tolerance to Treatment Patient tolerated treatment well  Problem List Impaired Intelligibility/speech;Impaired communication;Impaired  comprehension;Impaired language  Comment Patient tolerated treatment well. Improvement in expressive language for  conversation and verbal descriptions this date. No initial phrase repetitions or  difficulty with initiation observed. Semantic paraphasias x2 corrected with cues  to make inference about scene. Perseveration of one item x1 from previous scene.  Suspect visual scenes description and reading accuracy impacted slightly by  visual acuity changes from patient's macular  degeneration. When paragraph  presented in larger font phonemic errors were still noted. Required cues to slow  rate of reading and focus on individual words to increase reading accuracy.  Family and patient report overall improvement in articulation however slight  reduction of articulatory precision still noted. Benefited from cues for over  articulation when producing alliterative phrases. ]  With physical therapy-[Bed Mobility/Transfers  Sit to Stand Supervision/Standby assist  Stand to Sit Supervision/Standby assist  Technique Stand pivot  Trials/Comments 5xSTS: 17s, SBA    Ambulation  Ambulation Assistance Contact guard;Supervision/Standby assist;With cues  Quality of Gait Decreased ramses;Narrow base of support;Cues for increased  hip/knee flexion;Cues for step through;Decreased arm swing  Loss of Balance (# of times) 1 times  Distance Ambulated (ft) -- 40' intervals, multiple reps, c focus on increased  scanning, stops/starts and route-finding  Ambulation Safety Fair  Gait Comment TUs ]  With Occupational Therapy-[Bed Mobility/Transfers  Supine to Sit Supervision/Standby assist;With side rail/trapeze  Sit to Stand Supervision/Standby assist  Stand to Sit Supervision/Standby assist  Functional Mobility Supervision/Standby assist  Transfer Device -- gait belt  Trials/Comments Pt completing transfer from bed to chair, pt with mild  unsteadiness    Balance  Static Sitting Good  Dynamic Sitting Good  Static Standing Fair  Dynamic Standing Fair ]   He denies any cognitive changes. Has some slurring of his speech. Denies any  significant dysphagia. Does have some cough, nonproductive. No breathing  complaints. No abdominal complaints. No bowel complaints. No bladder complaints.  Denies any premorbid functional limitations. Was independent without a device.  Past Medical History: Past Medical History:  DiagnosisDate  ?CHF (congestive heart failure)  ?Coronary artery disease  ?Elevated  cholesterol  ?Hypertension  ?Sleep apnea  ?Stroke      Past Medical History:  Past Medical History:  Diagnosis Date  ? Anxiety  ? Atrial fibrillation  ? Benign essential HTN  ? CHF (congestive heart failure)  ? Coronary artery disease  ? Exercise intolerance  short of breath  ? GERD (gastroesophageal reflux disease)  no meds  ? Glaucoma  ? Kidney stones  ? Macular degeneration  ? Myocardial infarction 1991  stent placement  ? Nonsenile cataract  removed bilaterally  ? Sleep apnea  ? Stroke 2006  peripheral vision diminished left eye  ? Valvular heart disease  ? Wears dentures  full upper, partial bottom    Past Surgical History:  Past Surgical History:  Procedure Laterality Date  ? CARDIAC CATHETERIZATION  ? CATARACT EXTRACTION, BILATERAL  ? DRUG ELUTING STENT PLACEMENT 02/14/2014  Xience 4.0 x15, 4.0 x 18, 3.5 x 38, 3.0 x 38 in RCA  ? EYE SURGERY  Surgical History  Past Surgical History:  ProcedureLateralityDate  ?CARDIAC CATHETERIZATION  ?CARDIAC SURGERY  ?EYE SURGERY    Plan of Care  Updated Problems/Interventions  Field    Expected Intensity:  Average of 3 hours of therapy 5 days/week.  Interdisciplinary Team:  Interdisciplinary Team: Medical Supervision and 24 Hour Rehabilitation Nursing.,  Physical Therapy:, Occupational Therapy:, Speech and Language Therapy:, Social  Work, Therapeutic Recreation., Psychology., Registered Dietitian.  Physical Therapy Intensity/Duration: 1 hour / day, 5 days / week, for  approximately 2 weeks  Occupational Therapy Intensity/Duration: 1 hour / day, 5 days / week, for  approximately 2 weeks  Speech Language Pathology  Intensity/Duration: 1 hour / day, 5 days / week, for  approximately 2 weeks  Estimated Length of Stay/Anticipated Discharge Date: 2 weeks  Anticipated Discharge Destination:  Anticipated discharge destination from inpatient rehabilitation is community  discharge with assistance. Home with wife who is available 24/7 and can  physically assist.  Children and neighbors  also available to help.      Based on the patient's medical and functional status, their prognosis and  expected level of functional improvement is:  Goals are supervision standby  assist.  Rehabilitation prognosis fair.  Medical prognosis fair.    Signed by: Maria G Grace RN

## 2023-08-31 NOTE — PROGRESS NOTES
"Section B. Hearing, Speech, Vision: Expression of Ideas and Wants: Exhibits some  difficulty with expressing needs and ideas (e.g., some words or finishing  thoughts) or speech is not clear.  Understanding Verbal and Non-Verbal Content: Usually Understands: Understands  most conversations, but misses some part/intent of message. Requires cues at  times to understand.    Section C. Cognitive Patterns: Brief Interview for Mental Status (BIMS) was  conducted.  Repetition of Three Words: Three words  Able to report correct year: Correct  Able to report correct month: Accurate within 5 days  Able to report correct day of the week: Correct  Able to recall \"sock\": Yes, after cuing  Able to recall \"blue\": No, could not recall  Able to recall \"bed\": Yes, after cuing    BIMS SUMMARY SCORE: 11 Moderately impaired Patient was able to complete the  Brief Interview for Mental Status    Section C. Signs and Symptoms of Delirium (from CAM): Evidence of Acute Change  in Mental Status:   No  Inattention: Behavior not present  Thinking Disorganized or Incoherent:   Behavior not present  Altered Level of Consciousness:   Behavior not present    Signed by: Blanche Snell, SLP    "

## 2023-08-31 NOTE — PROGRESS NOTES
LOS: 1 day   Patient Care Team:  Riki Isabel MD as PCP - General (Family Medicine)      DEBRA MARROQUIN  1940      ADMITTING DIAGNOSIS:  Status post left frontal CVA       Subjective   Tolerates initial therapies.  No new weakness.  Dysarthria about the same      Objective     Vitals:    08/31/23 0500   BP: 155/79   Pulse: 82   Resp: 18   Temp: 97.7 °F (36.5 °C)   SpO2: 96%       Intake/Output Summary (Last 24 hours) at 8/31/2023 0941  Last data filed at 8/31/2023 0710  Gross per 24 hour   Intake 240 ml   Output 370 ml   Net -130 ml     PHYSICAL EXAM:   MENTAL STATUS -  AWAKE / ALERT  HEENT- NCAT, PUPILS EQUALLY ROUND, SCLERAE ANICTERIC, CONJUNCTIVAE PINK, OP MOIST, NO JVD, EARS UNREMARKABLE EXTERNALLY  LUNGS - CTA, NO WHEEZES, RALES OR RHONCHI  HEART- RRR with occasional skipped beat, NO RUB, MURMUR, OR GALLOP  ABD - NORMOACTIVE BOWEL SOUNDS, SOFT, NT. NO HEPATOSPLENOMEGALY APPRECIATED  EXT - NO EDEMA OR CYANOSIS  NEURO -oriented person place time and situation.    Speech was fluent  Mild right facial droop  Dysarthria  MOTOR EXAM -patient resistance well with bilateral shoulder flexion, elbow flexion, left finger flexion with mild weakness with right finger flexion, and takes resistance with bilateral knee extension and ankle dorsiflexion.  Impaired dexterity bilaterally      MEDICATIONS  Scheduled Meds:amLODIPine, 5 mg, Oral, Q24H  aspirin, 81 mg, Oral, Q24H  atorvastatin, 80 mg, Oral, Q24H  clopidogrel, 75 mg, Oral, Q24H  furosemide, 40 mg, Oral, Q24H  metoprolol tartrate, 12.5 mg, Oral, Q12H  multivitamin, 1 tablet, Oral, Q24H      Continuous Infusions:   PRN Meds:.  ALPRAZolam    guaifenesin      RESULTS  No results found for: POCGLU            Invalid input(s): LABALBU, PROT        ASSESSMENT and PLAN    Acute ischemic left MCA stroke    Status post left frontal CVA August 23, 2023  Status post tPA.  Status post left middle cerebral artery thrombectomy using stent and retriever device.  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  Small chronic infarcts right thalamus and right cerebellar hemisphere.  Occlusion of left internal carotid at the level of the bifurcation  Right common-internal carotid artery stent     Impaired mobility  Impaired self-care     Dysarthria     Baseline left visual field deficit from prior stroke     Aortic valve replacement-23 mm Magna Ease valve  Cardiac-coronary artery disease status post stent.  Atrial fibrillation.  Congestive heart failure.  Furosemide/metoprolol/amlodipine     30-day cardiac monitor at discharge     Stroke prophylaxis-aspirin/Plavix/atorvastatin     Anxiety-alprazolam as needed.  Randall report reviewed     DVT prophylaxis-on dual antiplatelet therapy.  SCDs.    Goal is for home with home health   therapies.  Barrier to discharge:.  Mobility and self-care- work on, transfers, progressive ambulation, ADLs to overcome      Eric Joya MD      During rounds, used appropriate personal protective equipment including mask and gloves.  Additional gown if indicated.  Mask used was standard procedure mask. Appropriate PPE was worn during the entire visit.  Hand hygiene was completed before and after.

## 2023-08-31 NOTE — PROGRESS NOTES
Inpatient Rehabilitation Plan of Care Note    Plan of Care  Care Plan Reviewed - No updates at this time.    Sphincter Control    Performed Intervention(s)  monitor intake and output  bowel training program  encourage po fluids  use incontinence products      Safety    Performed Intervention(s)  falls precaution protocol  items w/I reach  safety rounds  bed alarm and / chair alarm      Psychosocial    Performed Intervention(s)  Therapeutic environmental set up  Verbalizes needs and concerns    Signed by: Massiel Chew RN

## 2023-08-31 NOTE — PLAN OF CARE
Goal Outcome Evaluation:  Plan of Care Reviewed With: patient        Progress: improving     Patient is calm and cooperative. Alert and oriented x 2 - 3. Forgetful. Word finding difficulty. Slight facial droop. Baseline, left eye partial vision field from prior stroke. CGA. He is using the call light for assistance. Continent of B/B. Denied pain. He is taking his medication whole with thin liquids. Family at bedside. He participated in therapies. Will continue to monitor.

## 2023-08-31 NOTE — THERAPY EVALUATION
"Inpatient Rehabilitation - Speech Language Pathology Initial Evaluation  [facility]     Patient Name: Colin Connor  : 1940  MRN: [id number]  Today's Date: 2023               Admit Date: 2023     Visit Dx:  No diagnosis found.  Patient Active Problem List   Diagnosis    Acute ischemic left MCA stroke     Past Medical History:   Diagnosis Date    CHF (congestive heart failure)     Coronary artery disease     Elevated cholesterol     Hypertension     Sleep apnea     Stroke      Past Surgical History:   Procedure Laterality Date    CARDIAC CATHETERIZATION      CARDIAC SURGERY      EYE SURGERY         SLP Recommendation and Plan  Cognitive linguistic evaluation completed in setting of L MCA CVA. Acute care evaluation at [facility]. with mild aphasia, apraxia, dysarthria. Pt underwent [facility] recommending regular/thins. At time of evaluation, pt and spouse endorse no current concerns with speech, language, or cognition.      CLQT administered. Pt obtains an overall composite score of 2.2, indicative of \"moderate cognitive impairment.\" Subtests indicate: score of 1 or severe impairment for attention; score of 2 or moderate impairment for visuospatial skills and executive function; score of 3 or mild impairment for memory and language, score of 1 for severely impaired clock draw. Informally, semantic paraphasias x2 with pt aware, and can repair. No dysarthria appreciated - 100% intelligibility at conversational level with unfamiliar listener. Of note, baseline visual impairments (macular degeneration and decreased peripheral vision from prior CVA may slightly skew written and visuospatial task results).      Discussed results with pt, recommendations for cognitive tx, pt in agreement. Will follow x5 weekly, x1 hr daily for cognitive linguistic plan of therapy.           SLP EVALUATION (last 72 hours)       SLP SLC Evaluation       Row Name 23 1230 23 0830             Communication " Assessment/Intervention    Document Type evaluation  -AB evaluation  -AB      Subjective Information no complaints  -AB no complaints  -AB      Patient Observations alert;cooperative;agree to therapy  -AB alert;agree to therapy;cooperative  -AB      Patient/Family/Caregiver Comments/Observations seen in room 4413  -AB pt seen in room 4413, spouse at bedside throughout  -AB      Patient Effort good  -AB good  -AB      Symptoms Noted During/After Treatment none  -AB none  -AB         General Information    Patient Profile Reviewed yes  -AB yes  -AB      Pertinent History Of Current Problem 82-year-old male who presented to [facility] with right-sided weakness and aphasia on August 23, 2023. Also had right facial drooping. Garbled speech. Acute left internal carotid artery occlusion secondary to ruptured internal carotid artery plaque and left MCA thrombus and occlusion. He received tPA. He underwent emergent endovascular mechanical thrombectomy of the left internal carotid artery and middle cerebral artery and left carotid artery angioplasty and stenting. He is to continue aspirin Plavix and statin for stroke prophylaxis. For hypertension continued amlodipine and Lopressor. Hyperlipidemia-high intensity statin. Chronic diastolic congestive heart failure.. 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 Continues on Lasix 40 mg daily. History of aortic valve replacement 2018 with perioperative atrial fibrillation. Remained in normal sinus rhythm during his recent hospital stay.  -AB 82-year-old male who presented to [facility] with right-sided weakness and aphasia on August 23, 2023.  Also had right facial drooping.  Garbled speech.  Acute left internal carotid artery occlusion secondary to ruptured internal carotid artery plaque and left MCA thrombus and occlusion.  He received tPA.  He underwent emergent endovascular mechanical thrombectomy of the left internal carotid artery and middle cerebral artery and left carotid artery angioplasty and  stenting.  He is to continue aspirin Plavix and statin for stroke prophylaxis.  For hypertension continued amlodipine and Lopressor.  Hyperlipidemia-high intensity statin.  Chronic diastolic congestive heart failure..  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  Continues on Lasix 40 mg daily.  History of aortic valve replacement 2018 with perioperative atrial fibrillation.  Remained in normal sinus rhythm during his recent hospital stay.  -AB      Precautions/Limitations, Vision corrective lenses needed for reading;vision impairment, bilaterally  hx macular degeneration  -AB corrective lenses needed for reading;vision impairment, bilaterally  hx macular degeneration  -AB      Precautions/Limitations, Hearing WFL  -AB WFL  -AB      Patient Level of Education -- social hx: retired , lives with spouse, responsible for some IADLS (driving, med/appointment management), wife handles finances  -AB      Prior Level of Function-Communication WFL  -AB WFL  -AB      Plans/Goals Discussed with patient;spouse/S.O.;agreed upon  -AB patient;spouse/S.O.;agreed upon  -AB      Barriers to Rehab none identified  -AB none identified  -AB      Standardized Assessment Used CLQT  -AB CLQT  -AB         Pain    Additional Documentation Pain Scale: Numbers Pre/Post-Treatment (Group)  -AB --         Pain Scale: Numbers Pre/Post-Treatment    Pretreatment Pain Rating 0/10 - no pain  -AB --      Posttreatment Pain Rating 0/10 - no pain  -AB --         Motor Speech Assessment/Intervention    Motor Speech Function -- WFL  -AB      Speech intelligibility -- 100%;with unfamiliar listener  -AB      Motor Speech, Comment -- motor speech WNL, 100% intelligibility with unfamiliar listener.  -AB         Standardized Tests    Cognitive/Memory Tests CLQT: Cognitive Linguistic Quick Test  -AB CLQT: Cognitive Linguistic Quick Test  -AB         CLQT (The Cognitive Linguistic Quick Test)    Attention Domain Score 34  -AB --      Attention Severity Rating 1: Severe  -AB --       Memory Domain Score 116  -AB --      Memory Severity Rating 3: Mild  -AB --      Executive Function Domain Score 10  -AB --      Executive Function Severity Rating 2: Moderate  -AB --      Language Domain Score 27  -AB --      Language Severity Rating 3: Mild  -AB --      Visuospatial Domain Score 22  -AB --      Visuospatial Severity Rating 2: Moderate  -AB --      Clock Drawing Total Score 1  -AB --      Clock Drawing Severity Rating Severe  -AB --      Composite Severity Rating 2.2  -AB --      Composite Severity Rating Range 2.4 - 1.5: Moderate  -AB --      CLQT Comments Within subtests, note the following clinical impressions. PERSONAL FACTS: , SYMBOL CANCELLATION: 0/12 (2 symbols correct, 7 incorrect, does not pass initial horizontal x2 lines within time constraint); CONFRONTATION NAMIN/10 (semantic paraphasia x1); CLOCK DRAWIN/13 (write numbers 11 and 10 in correct position, no other numbers or hands despite cues); STORY RETELLING: 10/18 detail recall, 2/6 paragraph level comprehension; SYMBOL TRAILS: 0/10 (significant difficulty in following commands despite 100% accuracy on training tasks); GENERATIVE NAMIN/9, generates 10 concrete items, 8 abstract items within 1 minute time constraint; DESIGN MEMORY: ; MAZES: ; DESIGN GENERATION:  (following complex directions, adds additional lines).  -AB CLQT initiated - see additional sessiion [id number] for final scoring/results.  -AB         SLP Evaluation Clinical Impressions    SLP Diagnosis moderate;cognitive-linguistic disorder  -AB --      Rehab Potential/Prognosis good  -AB --      [facility] Criteria for Skilled Therapy Interventions Met yes  -AB --      Functional Impact difficulty in expressing complex messages;restrictions in personal and social life;difficulty completing home management task  -AB --         SLP Treatment Clinical Impressions    Care Plan Review evaluation/treatment results reviewed;care plan/treatment goals  reviewed;risks/benefits reviewed;patient/other agree to care plan;current/potential barriers reviewed  -AB --         Recommendations    Therapy Frequency (SLP SLC) 5 days per week  -AB --      Predicted Duration Therapy Intervention (Days) until discharge  -AB --      Anticipated Discharge Disposition (SLP) home with home health;home with  services  -AB --         Communication Treatment Objective and Progress Goals (SLP)    Verbal Expression Treatment Objectives -- --      Cognitive Linguistic Treatment Objectives -- --         Verbal Expression Treatment Objectives    Word Retrieval Skills Selection -- --         Word Retrieval Skills Goal 1 (SLP)    Improve Word Retrieval Skills By Goal 1 (SLP) -- --      Time Frame (Word Retrieval Goal 1, SLP) -- --      Progress/Outcomes (Word Retrieval Goal 1, SLP) -- --         Cognitive Linguistic Treatment Objectives    Attention Selection -- --      Memory Skills Selection -- --      Organizational Skills Selection -- --      Reasoning Selection -- --      Problem Solving Selection -- --      Functional Math Skills Selection -- --      Executive Function Skills Selection -- --         Attention Goal 1 (SLP)    Improve Attention by Goal 1 (SLP) -- --      Time Frame (Attention Goal 1, SLP) -- --      Progress/Outcomes (Attention Goal 1, SLP) -- --         Memory Skills Goal 1 (SLP)    Improve Memory Skills Through Goal 1 (SLP) -- --      Time Frame (Memory Skills Goal 1, SLP) -- --      Progress/Outcomes (Memory Skills Goal 1, SLP) -- --         Functional Math Skills Goal 1 (SLP)    Improve Functional Math Skills Through Goal 1 (SLP) -- --      Time Frame (Functional Math Skills Goal 1, SLP) -- --      Progress/Outcomes (Functional Math Skills Goal 1, SLP) -- --         Executive Functional Skills Goal 1 (SLP)    Improve Executive Function Skills Goal 1 (SLP) -- --      Time Frame (Executive Function Skills Goal 1, SLP) -- --      Progress/Outcomes (Executive Function  Skills Goal 1, SLP) -- --                User Key  (r) = Recorded By, (t) = Taken By, (c) = Cosigned By      Initials Name Effective Dates    AB Blanche Snell, MS CCC-SLP 12/27/22 -                        EDUCATION  The patient has been educated in the following areas:     Cognitive Impairment Communication Impairment.           SLP GOALS       Row Name 08/31/23 1230          Word Retrieval Skills Goal 1 (SLP)    Improve Word Retrieval Skills By Goal 1 (SLP) completing functional word finding tasks;80%;independently (over 90% accuracy)  -AB     Time Frame (Word Retrieval Goal 1, SLP) short term goal (STG)  -AB     Progress/Outcomes (Word Retrieval Goal 1, SLP) new goal  -AB        Attention Goal 1 (SLP)    Improve Attention by Goal 1 (SLP) complete selective attention task;complete sustained attention task;80%;independently (over 90% accuracy)  -AB     Time Frame (Attention Goal 1, SLP) short term goal (STG)  -AB     Progress/Outcomes (Attention Goal 1, SLP) new goal  -AB        Memory Skills Goal 1 (SLP)    Improve Memory Skills Through Goal 1 (SLP) recalling related word lists with an imposed delay;recalling unrelated word lists with an imposed delay;listen to a paragraph and answer questions;80%;independently (over 90% accuracy)  -AB     Time Frame (Memory Skills Goal 1, SLP) short term goal (STG)  -AB     Progress/Outcomes (Memory Skills Goal 1, SLP) new goal  -AB        Functional Math Skills Goal 1 (SLP)    Improve Functional Math Skills Through Goal 1 (SLP) complete functional math task;complete word problems involving time;complete word problems involving money;80%;independently (over 90% accuracy)  -AB     Time Frame (Functional Math Skills Goal 1, SLP) short term goal (STG)  -AB     Progress/Outcomes (Functional Math Skills Goal 1, SLP) new goal  -AB        Executive Functional Skills Goal 1 (SLP)    Improve Executive Function Skills Goal 1 (SLP) complex organization/planning activity;home management  activity;80%;independently (over 90% accuracy)  -AB     Time Frame (Executive Function Skills Goal 1, SLP) short term goal (STG)  -AB     Progress/Outcomes (Executive Function Skills Goal 1, SLP) new goal  -AB               User Key  (r) = Recorded By, (t) = Taken By, (c) = Cosigned By      Initials Name Provider Type    Blanche June, MS CCC-SLP Speech and Language Pathologist                            Time Calculation:      Time Calculation- SLP       Row Name 08/31/23 1429 08/31/23 1411          Time Calculation- SLP    SLP Start Time 1230  -AB 0830  -AB     SLP Stop Time 1300  -AB 0900  -AB     SLP Time Calculation (min) 30 min  -AB 30 min  -AB     SLP Received On -- 08/31/23  -AB        Timed Charges    96125-Standardized cognitive performance testing -- 120  -AB        Total Minutes    Timed Charges Total Minutes -- 120  -AB      Total Minutes -- 120  -AB               User Key  (r) = Recorded By, (t) = Taken By, (c) = Cosigned By      Initials Name Provider Type    Blanche June, MS CCC-SLP Speech and Language Pathologist                    Therapy Charges for Today       Code Description Service Date Service Provider Modifiers Qty    93850641931 HC ST STD COG PERF TEST PER HOUR 8/31/2023 Blanche Snell, MS CCC-SLP GN 2                       Blanche Snell MS CCC-SLP  8/31/2023

## 2023-08-31 NOTE — PROGRESS NOTES
Recreational Therapy Note    Patient Name: Colin Connor   MRN: [id number]    Therapeutic Recreation Eval and Treat (last 12 hours)       Therapeutic Recreation Eval & Treat       Row Name 08/31/23 1456       Lifestyle/Recreational History/Interest    Current Living Situation with family  -    Transportation Situation car, drives self  -      Row Name 08/31/23 1456       Recreational History and Interests    How Important is Recreation to You? somewhat important  -    Satisfied With Leisure Lifestyle? yes  -    Current Hobbies/Interests outdoor activities;family functions  -    Outdoor Activities --  mowing  -      Row Name 08/31/23 1456       Coping/Wellbeing    Current State of Wellbeing calm  -    Factors Impacting Current State of Wellbeing no significant issues  -      Row Name 08/31/23 1456       Therapeutic Recreation Participation    Orientation to Therapeutic Recreation patient/family;completed therapeutic recreation assessment  pt's wife present  -      Row Name 08/31/23 1456       Therapeutic Recreation Assessment/Plan    Recreation Plan structured leisure participation  -              User Key  (r) = Recorded By, (t) = Taken By, (c) = Cosigned By      Initials Name Provider Type    SS Pau Palafox, CTRS Recreational Therapist                      LORNE Richards  8/31/2023

## 2023-08-31 NOTE — PROGRESS NOTES
Inpatient Rehabilitation Plan of Care Note    Plan of Care  Care Plan Reviewed - No updates at this time.    Psychosocial    [RN] Coping/Adjustment(Active)  Current Status(08/31/2023): Expresses appropriate coping  Weekly Goal(09/07/2023): Allow opportunity to express concerns regarding current  status  Discharge Goal: Adequate coping regarding life changes with ongoing support        Sphincter Control    Performed Intervention(s)  monitor intake and output  bowel training program  encourage po fluids  use incontinence products      Safety    Performed Intervention(s)  falls precaution protocol  items w/I reach  safety rounds  bed alarm and / chair alarm    Signed by: Phoenix Helton, RN

## 2023-08-31 NOTE — PROGRESS NOTES
Inpatient Rehabilitation Functional Measures Assessment and Plan of Care    Plan of Care  Updated Problems/Interventions  Cognition    [ST] Attention(Active)  Current Status(08/31/2023): sustained attention severely impaired, multistep  directions, 0/12 0% accuracy I'ly  Weekly Goal(09/07/2023): sustained attention improved, 50% with MIN cues  Discharge Goal: functional attention for integration into home and community  settings    [ST] Memory(Active)  Current Status(08/31/2023): delayed recall following 5 minute delay 2/3 67% with  cues  Weekly Goal(09/07/2023): delayed recall following 5 minutes 3/3, 100% accuracy  MIN cues  Discharge Goal: functional memory for integration into home and community  settings    [ST] Problem Solving(Active)  Current Status(08/31/2023): functional problem solving, 2/12, 17% accuracy I'ly  Weekly Goal(09/07/2023): functional problem solving 50% MIN cues  Discharge Goal: functional problem solving for integration into home and  community settings    Functional Measures  SONNY Eating:  [facility] Grooming: [facility] Bathing:  [facility] Upper Body Dressing:  [facility] Lower Body Dressing:  [facility] Toileting:  [facility] Bladder Management  Level of Assistance:  Merrill  Frequency/Number of Accidents this Shift:  [facility] Bowel Management  Level of Assistance: Merrill  Frequency/Number of Accidents this Shift: [facility] Bed/Chair/Wheelchair Transfer:  [facility] Toilet Transfer:  [facility] Tub/Shower Transfer:  Merrill    Previously Documented Mode of Locomotion at Discharge: Field  SONNY Expected Mode of Locomotion at Discharge: [facility] Walk/Wheelchair:  [facility] Stairs:  [facility] Comprehension:  [facility] Expression:  [facility] Social Interaction:  [facility] Problem Solving:  [facility] Memory:  Merrill    Therapy Mode Minutes  Occupational Therapy: Branch  Physical Therapy: Branch  Speech Language Pathology:  Branch    Signed by: Blanche Snell  SLP

## 2023-08-31 NOTE — PROGRESS NOTES
SECTION GG    Mobility Performance:     Roll Left and Right: Tibbie provides verbal cues and/or touching/steadying  and/or contact guard assistance as patient completes activity. Assistance may be  provided throughout the activity or intermittently.   Sit to Lying: Tibbie provides verbal cues and/or touching/steadying and/or  contact guard assistance as patient completes activity. Assistance may be  provided throughout the activity or intermittently.   Lying to Sitting on Side of Bed: Tibbie provides verbal cues and/or  touching/steadying and/or contact guard assistance as patient completes  activity. Assistance may be provided throughout the activity or intermittently.   Sit to Stand: Tibbie provides verbal cues and/or touching/steadying and/or  contact guard assistance as patient completes activity. Assistance may be  provided throughout the activity or intermittently.   Chair/Bed to Chair Transfer: Tibbie provides verbal cues and/or  touching/steadying and/or contact guard assistance as patient completes  activity. Assistance may be provided throughout the activity or intermittently.   Car Transfer: Tibbie provides verbal cues and/or touching/steadying and/or  contact guard assistance as patient completes activity. Assistance may be  provided throughout the activity or intermittently.   Walk 10 Feet:   Tibbie provides verbal cues and/or touching/steadying and/or  contact guard assistance as patient completes activity. Assistance may be  provided throughout the activity or intermittently.  Walk 50 Feet with 2 Turns:   Tibbie provides verbal cues and/or  touching/steadying and/or contact guard assistance as patient completes  activity. Assistance may be provided throughout the activity or intermittently.  Walk 150 Feet:   Tibbie provides verbal cues and/or touching/steadying and/or  contact guard assistance as patient completes activity. Assistance may be  provided throughout the activity or  intermittently.  Walking 10 Feet on Uneven Surfaces:   Hillman provides verbal cues and/or  touching/steadying and/or contact guard assistance as patient completes  activity. Assistance may be provided throughout the activity or intermittently.  1 Step Over Curb or Up/Down Stair:   Hillman provides verbal cues and/or  touching/steadying and/or contact guard assistance as patient completes  activity. Assistance may be provided throughout the activity or intermittently.  4 Steps Up and Down, With/Without Rail:   Hillman provides verbal cues and/or  touching/steadying and/or contact guard assistance as patient completes  activity. Assistance may be provided throughout the activity or intermittently.  12 Steps Up and Down, With/Without Rail:   Not attempted due to medical or  safety concerns.  Picking up an Object:   Hillman provides verbal cues and/or touching/steadying  and/or contact guard assistance as patient completes activity. Assistance may be  provided throughout the activity or intermittently.  Uses Wheelchair/Scooter: No    Mobility Discharge Goals:   Walk Discharge Goals:   12 Steps Up and Down, With/Without Rail: Hillman provides verbal cues or  touching/steadying assistance as patient completes activity.    Section J. Health Conditions (Pain):  Pain Interference with Therapy Activities:   Rarely or not at all  Pain Interference with Day-to-Day Activities:   Rarely or not at all    Signed by: Anita Boswell, PT

## 2023-08-31 NOTE — CONSULTS
"Nutrition Services    Patient Name:  Colin Connor  YOB: 1940  MRN: [id number]  Admit Date:  8/30/2023    CLINICAL NUTRITION ASSESSMENT    Assessment Date:  08/31/23    Summary: Pt is a 82 year old male admitted to acute rehab following hospitalization for stroke. PMH pertinent for CHF, HTN, CAD and elevated cholesterol. Pt's current diet is regular/thins. Appetite/intake is good per patient's report; PO 25-75% x 2 meals thus far. Denies weight change. Labs and skin status reviewed, no impairment or wounds to note.     Plan/Recommend:  Continue with regular diet as tolerated. Encourage intake.   Care Cuisine menu for pt's prescribed diet provided, modesta neves cartroger items reviewed.   Monitor intake, labs, weight and skin status.   RD to follow.          Reason for Assessment Acute Rehab Admission  Nurse Admission Screen     Admitting Diagnosis   Acute ischemic left MCA stroke     Medical/Surgical History Past Medical History:   Diagnosis Date    CHF (congestive heart failure)     Coronary artery disease     Elevated cholesterol     Hypertension     Sleep apnea     Stroke        Past Surgical History:   Procedure Laterality Date    CARDIAC CATHETERIZATION      CARDIAC SURGERY      EYE SURGERY          Encounter Information        Pertinent Nutrition History Denies any chew/swallow issues. Reports appetite is good. Had the mikaela chicken plate at lunch.   Preferences    Factors Affecting Intake No factors at this time     Current Nutrition Orders & Evaluation of Intake       Oral Nutrition      Food Allergies NKFA    Current PO Diet Diet: Regular/House Diet; Texture: Regular Texture (IDDSI 7); Fluid Consistency: Thin (IDDSI 0)    Supplement    PO Evaluation      % PO Intake/# Days 25-75% x 2 meals   --  Anthropometrics        Current Height  Current Weight (CBW)  BMI kg/m2 Height: 167.6 cm (66\")  Weight: 87 kg (191 lb 12.8 oz) (08/30/23 1525)  Body mass index is 30.96 kg/m².   BMI Category Obese, Class I " (30 - 34.9)   Ideal Weight (IBW) 142#   Usual Weight (UBW) 199# at [place] 8/29/23   Weight Trend Other: denies any weight change   Weight History Wt Readings from Last 15 Encounters:   08/30/23 1525 87 kg (191 lb 12.8 oz)      --  Physical Findings          Physical Appearance alert, oriented, room air   Oral/Mouth Cavity dental appliance, tooth or teeth missing   Edema  lower extremity , 1+ (trace)   Gastrointestinal last bowel movement: 8/30   Skin  skin intact   Tubes/Drains/Lines none   NFPE No clinical signs of muscle wasting or fat loss     Tests/Procedures/Labs        Tests/Procedures No new tests/procedures       Pertinent Labs         Invalid input(s): LABALBU, PROT  Results from last 7 days   Lab Units 08/30/23  0440   TRIGLYCERIDES mg/dL 67         Lab Results   Component Value Date    HGBA1C 5.9 (H) 08/24/2023        Medications           Scheduled Medications amLODIPine, 5 mg, Oral, Q24H  aspirin, 81 mg, Oral, Q24H  atorvastatin, 80 mg, Oral, Q24H  clopidogrel, 75 mg, Oral, Q24H  furosemide, 40 mg, Oral, Q24H  metoprolol tartrate, 12.5 mg, Oral, Q12H  multivitamin, 1 tablet, Oral, Q24H       Infusions     PRN Medications   ALPRAZolam    guaifenesin        Nutrition Diagnosis        Nutrition Dx Problem  Problem: Nutrition Appropriate for Condition at this Time  Etiology: MNT for Treatment/Condition   Signs/Symptoms: Report/Observation  Comment:      NUTRITION INTERVENTION / PLAN OF CARE  Goals        Intervention Goal(s) Maintain nutrition status, Maintain intake, and PO intake goal %: 75+     Nutrition Intervention        RD Action Advise alternative selection, Menu provided, Encourage intake, Follow Tx Progress, and Care plan reviewed     Prescription        Diet Prescription    Supplement Prescription    Snack Prescription    EN Prescription    New Prescription Ordered? No changes at this time     Monitor/Evaluation        Monitor Per protocol   Discharge Needs No discharge needs identified at this  time   Education Will instruct as appropriate       RD to follow up per protocol.    Electronically signed by:  Heidi Aquino RD  08/31/23 13:16 EDT

## 2023-08-31 NOTE — THERAPY EVALUATION
Inpatient Rehabilitation - Occupational Therapy Initial Evaluation    [facility]     Patient Name: Colin Connor  : 1940  MRN: [id number]    Today's Date: 2023                 Admit Date: 2023       No diagnosis found.    Patient Active Problem List   Diagnosis    Acute ischemic left MCA stroke       Past Medical History:   Diagnosis Date    CHF (congestive heart failure)     Coronary artery disease     Elevated cholesterol     Hypertension     Sleep apnea     Stroke        Past Surgical History:   Procedure Laterality Date    CARDIAC CATHETERIZATION      CARDIAC SURGERY      EYE SURGERY               IRF OT ASSESSMENT FLOWSHEET (last 12 hours)       IRF OT Evaluation and Treatment       Row Name 23 1134          OT Time and Intention    Document Type initial evaluation  -KA     Mode of Treatment individual therapy;occupational therapy  -KA     Patient Effort good  -KA     Symptoms Noted During/After Treatment none  -KA       Row Name 23 1134          General Information    Patient Profile Reviewed yes  -KA     Patient/Family/Caregiver Comments/Observations Seated in recliner with spouse and family present  -KA     Existing Precautions/Restrictions fall  -KA       Row Name 23 1134          Previous Level of Function/Home Environm    Bathing/Grooming, Premorbid Functional Level independent  -KA     Dressing, Premorbid Functional Level independent  -KA     Eating/Feeding, Premorbid Functional Level independent  -KA     Toileting, Premorbid Functional Level independent  -KA     BADLs, Premorbid Functional Level independent  -KA     IADLs, Premorbid Functional Level independent  -KA       Row Name 23 1134          Living Environment    Current Living Arrangements home  -KA     Home Accessibility stairs to enter home;stairs within home  -KA     People in Home spouse  -KA       Row Name 23 1134          Home Main Entrance    Number of Stairs, Main Entrance two  2 to  enter garage. 5 to enter front of house and 4 to enter back of house  -     Stair Railings, Main Entrance railing on left side (ascending)  -       Row Name 08/31/23 1134          Stairs Within Home, Primary    Stairs, Within Home, Primary Full flight of stairs to bedroom and bathroom  Has half bath on main level  -       Row Name 08/31/23 1134          Home Use of Assistive/Adaptive Equipment    Equipment Currently Used at Home --  Has shower chair that he does not currently use  -       Row Name 08/31/23 1134          Pain Assessment    Pretreatment Pain Rating 0/10 - no pain  -     Posttreatment Pain Rating 0/10 - no pain  -[facility] Name 08/31/23 1134          Cognition/Psychosocial    Affect/Mental Status (Cognition) --  Mild word finding difficulties at times  -     Orientation Status (Cognition) oriented to;person;place;time  -     Follows Commands (Cognition) follows one-step commands;over 90% accuracy  -     Personal Safety Interventions fall prevention program maintained;gait belt;nonskid shoes/slippers when out of bed  -     Comment, Cognition Able to correctly identify 3/3 animals when shown pictures. Demo difficulty with word finding when shown a picture of a tractor. With verbal cue and increased time pt able to state  -[facility] Name 08/31/23 1134          Range of Motion (ROM)    Range of Motion bilateral upper extremities;ROM is WFL  -KA       Row Name 08/31/23 1134          Strength (Manual Muscle Testing)    Strength (Manual Muscle Testing) left upper extremity strength;right upper extremity strength  -     Left Upper Extremity Strength shoulder;elbow  -     Shoulder, Left (Strength) 4/5  -KA     Elbow, Left (Strength) 3+/5  -KA     Right Upper Extremity Strength shoulder;elbow  -     Shoulder, Right (Strength) 4/5  -KA     Elbow, Right (Strength) 4/5  -KA     Left Hand, Setting 2 (Dynamometer Testing) 65,65,60  -KA     Right Hand, Setting 2 (Dynamometer Testing)  60,55,50  -KA     Left Hand: Lateral (Key) Pinch Strength (Pinch Dynamometer Testing) 10,10  -KA     Left Hand: [place] (Frank) Pinch Strength (Pinch Dynamometer Testing) 5  -KA     Right Hand: Lateral (Key) Pinch Strength (Pinch Dynamometer Testing) 13,12  -KA     Right Hand: [place] (Frank) Pinch Strength (Pinch Dynamometer Testing) 7  -KA     Additional Documentation Left Hand, Setting 2 (Dynamometer Testing) (Row);Right Hand, Setting 2 (Dynamometer Testing) (Row);Left Hand: Lateral (Key) Pinch Strength (Pinch Dynamometer Testing) (Row);Left Hand: [place] (Frank) Pinch Strength (Pinch Dynamometer Testing) (Row);Right Hand: Lateral (Key) Pinch Strength (Pinch Dynamometer Testing) (Row);Right Hand: [place] (Frank) Pinch Strength (Pinch Dynamometer Testing) (Row)  -       Row Name 08/31/23 1134          Sensory    Additional Documentation Sensory Assessment (Somatosensory) (Group);Vision Assessment/Intervention (Group)  -       Row Name 08/31/23 1135          Vision Assessment/Intervention    Visual Field Deficit --  Baseline L visual field deficit from prior stroke  -     Vision Assessment Comment Worked on letter cancellation worksheet and found all K's correctly on both sides of paper. Pt able to draw clock with all numbers on it and correctly write 3:00  -       Row Name 08/31/23 1139          Sensory Assessment (Somatosensory)    Sensory Subjective Reports tingling  Reports tingling at times in R hand. Sterognosis intact as pt able to correctly identifiy 3/3 objects in R hand  -       Row Name 08/31/23 1136          Basic Activities of Daily Living (BADLs)    Basic Activities of Daily Living bathing;upper body dressing;lower body dressing;grooming;toileting;self-feeding  -       Row Name 08/31/23 1132          Bathing    [place] Level (Bathing) bathing skills;lower body;upper body;minimum assist (75% patient effort);verbal cues  -     Position (Bathing) supported sitting  -      Set-up Assistance (Bathing) obtain supplies;open containers  -       Row Name 08/31/23 1134          Upper Body Dressing    [place] Level (Upper Body Dressing) upper body dressing skills;doff;don  SBA  -KA     Position (Upper Body Dressing) supported sitting  -KA     Set-up Assistance (Upper Body Dressing) obtain clothing  -       Row Name 08/31/23 1134          Lower Body Dressing    [place] Level (Lower Body Dressing) don;doff;pants/bottoms;shoes/slippers;socks;minimum assist (75% patient effort)  -KA     Position (Lower Body Dressing) supported sitting  -KA     Comment (Lower Body Dressing) Able to don L sock with CGA while seated. Required mod A from therapist to don R sock  -       Row Name 08/31/23 1134          Grooming    [place] Level (Grooming) grooming skills;wash face, hands;oral care regimen;minimum assist (75% patient effort)  -KA     Position (Grooming) supported sitting  -KA     Set-up Assistance (Grooming) obtain supplies;open containers  -       Row Name 08/31/23 1134          Toileting    [place] Level (Toileting) toileting skills;adjust/manage clothing;perform perineal hygiene;minimum assist (75% patient effort)  -KA     Position (Toileting) supported sitting  -KA     Set-up Assistance (Toileting) obtain supplies  -       Row Name 08/31/23 1134          Self-Feeding    [place] Level (Self-Feeding) feeding skills;set up  -       Row Name 08/31/23 1134          Bed Mobility    Comment, (Bed Mobility) up in recliner  -       Row Name 08/31/23 1134          Functional Mobility    Functional Mobility- Comment Performed fxl mobility within room and hallway to gym with CGA and no use AD  -       Row Name 08/31/23 1134          Transfer Assessment/Treatment    Transfers toilet transfer;shower transfer  -     Comment, (Transfers) W/c to recliner with CGA and no AD  -KA       Row Name 08/31/23 1134          Toilet Transfer    Type (Toilet Transfer)  sit-stand;stand-sit  -     [place] Level (Toilet Transfer) contact guard  -       Row Name 08/31/23 1134          Motor Skills    Motor Skills coordination  -     Coordination 9 Hole Peg Test of Fine Motor Coordination Results;fine motor deficit;right;upper extremity;minimal impairment  mild  -     Results, 9 Hole Peg Test of Fine Motor Coordination R:34  L: 28 secs  Box and blocks: R- 41  L:47  -     Therapeutic Exercise shoulder;elbow/forearm;wrist;hand  -       Row Name 08/31/23 1134          Shoulder (Therapeutic Exercise)    Shoulder (Therapeutic Exercise) strengthening exercise  -     Shoulder Strengthening (Therapeutic Exercise) bilateral;flexion;extension;sitting;10 repetitions  2.5 dowel carlos for shoulder flex and forward press  -       Row Name 08/31/23 1134          Elbow/Forearm (Therapeutic Exercise)    Elbow/Forearm (Therapeutic Exercise) strengthening exercise  -     Elbow/Forearm Strengthening (Therapeutic Exercise) bilateral;flexion;extension;supination;pronation;sitting;3 lb free weight;15 repititions;3 sets  -       Row Name 08/31/23 1134          Balance    Balance Assessment sitting static balance;sitting dynamic balance;standing static balance;standing dynamic balance  -     Static Sitting Balance standby assist  -     Dynamic Sitting Balance standby assist  -     Position, Sitting Balance sitting edge of mat  -     Static Standing Balance standby assist  -     Dynamic Standing Balance contact guard  -       Row Name 08/31/23 1134          Positioning and Restraints    Pre-Treatment Position sitting in chair/recliner  -     Post Treatment Position chair  -     In Chair sitting;call light within reach;encouraged to call for assist;exit alarm on;with family/caregiver  -       Row Name 08/31/23 1134          Therapy Assessment/Plan (OT)    Patient's Goals For Discharge return home  -       Row Name 08/31/23 1134          Therapy Assessment/Plan (OT)     OT Diagnosis Pt seen for OT eval this AM. Admitted with L frontal CVA. Hx includes baseline L visual field deficit from prior stroke. Pt was indep at home with ADLs and mobility using no AD. Today pt performed fxl mobility with CGA and no device within room and to therapy gym. Required mod assist to don R sock and CGA for L sock. Min A LBD, SBA UBD. Presents with mild coordination deficit in R hand and weakness. Pt also with mild word finding difficulties. Pt to benefit from skilled OT to address deficits and maximize independence with ADLs.  -KA     Rehab Potential/Prognosis (OT) good, to achieve stated therapy goals  -     Estimated Duration of Therapy (OT) 1 week;2 weeks  -     Problem List (OT) problems related to;balance;cognition;coordination;strength  -     Planned Therapy Interventions (OT) activity tolerance training;BADL retraining;functional balance retraining;occupation/activity based interventions;strengthening exercise;transfer/mobility retraining  -       Row Name 08/31/23 1134          Evaluation Complexity (OT)    Review Occupational Profile/Medical/Therapy History Complexity expanded/moderate complexity  -     Assessment, Occupational Performance/Identification of Deficit Complexity 3-5 performance deficits  -     Clinical Decision Making Complexity (OT) detailed assessment/moderate complexity  -     Overall Complexity of Evaluation (OT) moderate complexity  -       Row Name 08/31/23 8292          Therapy Plan Review/Discharge Plan (OT)    Therapy Plan Review (OT) evaluation/treatment results reviewed;care plan/treatment goals reviewed;patient;spouse/significant other  -     Anticipated Discharge Disposition (OT) home with assist;home with outpatient therapy services  -       Row Name 08/31/23 1134          IRF OT Goals    Transfer Goal Selection (OT-IRF) transfers, OT goal 1;transfers, OT goal 2  -     Bathing Goal Selection (OT-IRF) bathing, OT goal 1;bathing, OT goal 2  -KA      UB Dressing Goal Selection (OT-IRF) UB dressing, OT goal 1;UB dressing, OT goal 2  -KA     LB Dressing Goal Selection (OT-IRF) LB dressing, OT goal 1;LB dressing, OT goal 2  -KA     Grooming Goal Selection (OT-IRF) grooming, OT goal 1;grooming, OT goal 2  -KA     Toileting Goal Selection (OT-IRF) toileting, OT goal 1;toileting, OT goal 2  -KA     Strength Goal Selection (OT-IRF) strength, OT goal 1 (free text)  -KA     Functional Mobility Goal Selection (OT) functional mobility, OT goal 1  -KA     Coordination Goal Selection (OT) coordination, OT goal 1  -KA       Row Name 08/31/23 1134          Transfer Goal 1 (OT-IRF)    Activity/Assistive Device (Transfer Goal 1, OT-IRF) toilet;shower chair  -KA     [place] Level (Transfer Goal 1, OT-IRF) standby assist  -KA     Time Frame (Transfer Goal 1, OT-IRF) short-term goal (STG)  -KA     Progress/Outcomes (Transfer Goal 1, OT-IRF) goal ongoing  -KA       Row Name 08/31/23 1134          Transfer Goal 2 (OT-IRF)    Activity/Assistive Device (Transfer Goal 2, OT-IRF) toilet;shower chair  -KA     [place] Level (Transfer Goal 2, OT-IRF) supervision required  -KA     Time Frame (Transfer Goal 2, OT-IRF) long-term goal (LTG)  -KA     Progress/Outcomes (Transfer Goal 2, OT-IRF) goal ongoing  -KA       Row Name 08/31/23 1134          Bathing Goal 1 (OT-IRF)    Activity/Device (Bathing Goal 1, OT-IRF) bathing skills, all  -KA     [place] Level (Bathing Goal 1, OT-IRF) contact guard required  -KA     Time Frame (Bathing Goal 1, OT-IRF) short-term goal (STG)  -KA     Progress/Outcomes (Bathing Goal 1, OT-IRF) goal ongoing  -KA       Row Name 08/31/23 1134          Bathing Goal 2 (OT-IRF)    Activity/Device (Bathing Goal 2, OT-IRF) bathing skills, all  -KA     [place] Level (Bathing Goal 2, OT-IRF) standby assist  -KA     Time Frame (Bathing Goal 2, OT-IRF) long-term goal (LTG)  -KA     Progress/Outcomes (Bathing Goal 2, OT-IRF) goal ongoing  -KA       Row Name  08/31/23 1134          UB Dressing Goal 1 (OT-IRF)    Activity/Device (UB Dressing Goal 1, OT-IRF) upper body dressing  -KA     [place] (UB Dress Goal 1, OT-IRF) set-up required  -KA     Time Frame (UB Dressing Goal 1, OT-IRF) short-term goal (STG)  -KA     Progress/Outcomes (UB Dressing Goal 1, OT-IRF) goal ongoing  -KA       Row Name 08/31/23 1134          UB Dressing Goal 2 (OT-IRF)    Activity/Device (UB Dressing Goal 2, OT-IRF) upper body dressing  -KA     [place] (UB Dress Goal 2, OT-IRF) set-up required  -KA     Time Frame (UB Dressing Goal 2, OT-IRF) long-term goal (LTG)  -KA     Progress/Outcomes (UB Dressing Goal 2, OT-IRF) goal ongoing  -KA       Row Name 08/31/23 1134          LB Dressing Goal 1 (OT-IRF)    Activity/Device (LB Dressing Goal 1, OT-IRF) lower body dressing  -KA     [place] (LB Dressing Goal 1, OT-IRF) minimum assist (75% or more patient effort)  -KA     Time Frame (LB Dressing Goal 1, OT-IRF) short-term goal (STG)  -KA     Progress/Outcomes (LB Dressing Goal 1, OT-IRF) goal ongoing  -KA       Row Name 08/31/23 1134          LB Dressing Goal 2 (OT-IRF)    Activity/Device (LB Dressing Goal 2, OT-IRF) lower body dressing  -KA     [place] (LB Dressing Goal 2, OT-IRF) contact guard required  -KA     Time Frame (LB Dressing Goal 2, OT-IRF) long-term goal (LTG)  -KA     Progress/Outcomes (LB Dressing Goal 2, OT-IRF) goal ongoing  -KA       Row Name 08/31/23 1134          Grooming Goal 1 (OT-IRF)    Activity/Device (Grooming Goal 1, OT-IRF) grooming skills, all  -KA     [place] (Grooming Goal 1, OT-IRF) contact guard required  -KA     Time Frame (Grooming Goal 1, OT-IRF) short-term goal (STG)  -KA     Progress/Outcomes (Grooming Goal 1, OT-IRF) goal ongoing  -KA       Row Name 08/31/23 1134          Grooming Goal 2 (OT-IRF)    Activity/Device (Grooming Goal 2, OT-IRF) grooming skills, all  -KA     [place] (Grooming Goal 2, OT-IRF) supervision required  -KA     Time  Frame (Grooming Goal 2, OT-IRF) long-term goal (LTG)  -KA     Progress/Outcomes (Grooming Goal 2, OT-IRF) goal ongoing  -KA       Row Name 08/31/23 1134          Toileting Goal 1 (OT-IRF)    Activity/Device (Toileting Goal 1, OT-IRF) toileting skills, all  -KA     [place] Level (Toileting Goal 1, OT-IRF) contact guard required  -KA     Progress/Outcomes (Toileting Goal 1, OT-IRF) goal ongoing  -KA     Time Frame (Toileting Goal 1, OT-IRF) short-term goal (STG)  -KA       Row Name 08/31/23 1134          Toileting Goal 2 (OT-IRF)    Activity/Device (Toileting Goal 2, OT-IRF) toileting skills, all  -KA     [place] Level (Toileting Goal 2, OT-IRF) standby assist  -KA     Progress/Outcomes (Toileting Goal 2, OT-IRF) goal ongoing  -KA     Time Frame (Toileting Goal 2, OT-IRF) long-term goal (LTG)  -KA       Row Name 08/31/23 1134          Strength Goal 1 (OT-IRF)    Strength Goal 1 (OT-IRF) Pt to demo understanding of HEP in order to improve overall endurance and strength required for ADLs  -KA     Time Frame (Strength Goal 1, OT-IRF) long-term goal (LTG)  -KA     Progress/Outcomes (Strength Goal 1, OT-IRF) goal ongoing  -KA       Row Name 08/31/23 1134          Functional Mobility Goal 1 (OT)    Activity/Assistive Device (Functional Mobility Goal 1, OT) --  No AD  -KA     [place] Level/Cues Needed (Functional Mobility Goal 1, OT) standby assist  -KA     Time Frame (Functional Mobility Goal 1, OT) long term goal (LTG)  -KA     Progress/Outcome (Functional Mobility Goal 1, OT) goal ongoing  -KA       Row Name 08/31/23 1134          Coordination Goal 1 (OT)    Activity/Assistive Device (Coordination Goal 1, OT) FM written ex program  -KA     [place] Level/Cues Needed (Coordination Goal 1, OT) independent  -KA     Time Frame (Coordination Goal 1, OT) long term goal (LTG)  -KA     Progress/Outcomes (Coordination Goal 1, OT) goal ongoing  -KA               User Key  (r) = Recorded By, (t) = Taken By, (c) =  Cosigned By      Initials Name Effective Dates    Verenice Goldsmith OT 09/22/22 -                      Occupational Therapy Education       Title: PT OT SLP Therapies (In Progress)       Topic: Occupational Therapy (Not Started)       Point: ADL training (Not Started)       Description:   Instruct learner(s) on proper safety adaptation and remediation techniques during self care or transfers.   Instruct in proper use of assistive devices.                  Learner Progress:  Not documented in this visit.              Point: Home exercise program (Not Started)       Description:   Instruct learner(s) on appropriate technique for monitoring, assisting and/or progressing therapeutic exercises/activities.                  Learner Progress:  Not documented in this visit.              Point: Precautions (Not Started)       Description:   Instruct learner(s) on prescribed precautions during self-care and functional transfers.                  Learner Progress:  Not documented in this visit.              Point: Body mechanics (Not Started)       Description:   Instruct learner(s) on proper positioning and spine alignment during self-care, functional mobility activities and/or exercises.                  Learner Progress:  Not documented in this visit.                                        OT Recommendation and Plan    Planned Therapy Interventions (OT): activity tolerance training, BADL retraining, functional balance retraining, occupation/activity based interventions, strengthening exercise, transfer/mobility retraining                    Time Calculation:      Time Calculation- OT       Row Name 08/31/23 1457             Time Calculation- OT    OT Start Time 1030  -KA      OT Stop Time 1130  -KA      OT Time Calculation (min) 60 min  -SHAD                User Key  (r) = Recorded By, (t) = Taken By, (c) = Cosigned By      Initials Name Provider Type    Verenice Goldsmith OT Occupational Therapist                   Therapy Charges for Today       Code Description Service Date Service Provider Modifiers Qty    [id number]  OT EVAL MOD COMPLEXITY 3 8/31/2023 Verenice Santos OT GO 1    [id number]  OT THER PROC EA 15 MIN 8/31/2023 Verenice Santos OT GO 1    [id number]  OT SELF CARE/MGMT/TRAIN EA 15 MIN 8/31/2023 Verenice Santos OT GO 1                     Verenice Santos OT  8/31/2023

## 2023-09-01 VITALS
DIASTOLIC BLOOD PRESSURE: 69 MMHG | RESPIRATION RATE: 18 BRPM | TEMPERATURE: 97 F | WEIGHT: 184.5 LBS | SYSTOLIC BLOOD PRESSURE: 146 MMHG | HEART RATE: 68 BPM | OXYGEN SATURATION: 96 % | HEIGHT: 66 IN | BODY MASS INDEX: 29.65 KG/M2

## 2023-09-01 LAB
25(OH)D3 SERPL-MCNC: 27.6 NG/ML (ref 30–100)
ANION GAP SERPL CALCULATED.3IONS-SCNC: 11.2 MMOL/L (ref 5–15)
BASOPHILS # BLD AUTO: 0.03 10*3/MM3 (ref 0–0.2)
BASOPHILS NFR BLD AUTO: 0.4 % (ref 0–1.5)
BUN SERPL-MCNC: 18 MG/DL (ref 8–23)
BUN/CREAT SERPL: 14.9 (ref 7–25)
CALCIUM SPEC-SCNC: 9.2 MG/DL (ref 8.6–10.5)
CHLORIDE SERPL-SCNC: 104 MMOL/L (ref 98–107)
CO2 SERPL-SCNC: 26.8 MMOL/L (ref 22–29)
CREAT SERPL-MCNC: 1.21 MG/DL (ref 0.76–1.27)
DEPRECATED RDW RBC AUTO: 43.8 FL (ref 37–54)
EGFRCR SERPLBLD CKD-EPI 2021: 59.8 ML/MIN/1.73
EOSINOPHIL # BLD AUTO: 0.19 10*3/MM3 (ref 0–0.4)
EOSINOPHIL NFR BLD AUTO: 2.5 % (ref 0.3–6.2)
ERYTHROCYTE [DISTWIDTH] IN BLOOD BY AUTOMATED COUNT: 12.8 % (ref 12.3–15.4)
GLUCOSE SERPL-MCNC: 106 MG/DL (ref 65–99)
HCT VFR BLD AUTO: 52.1 % (ref 37.5–51)
HGB BLD-MCNC: 17.9 G/DL (ref 13–17.7)
IMM GRANULOCYTES # BLD AUTO: 0.02 10*3/MM3 (ref 0–0.05)
IMM GRANULOCYTES NFR BLD AUTO: 0.3 % (ref 0–0.5)
LYMPHOCYTES # BLD AUTO: 1.15 10*3/MM3 (ref 0.7–3.1)
LYMPHOCYTES NFR BLD AUTO: 15.3 % (ref 19.6–45.3)
MCH RBC QN AUTO: 31.8 PG (ref 26.6–33)
MCHC RBC AUTO-ENTMCNC: 34.4 G/DL (ref 31.5–35.7)
MCV RBC AUTO: 92.5 FL (ref 79–97)
MONOCYTES # BLD AUTO: 0.76 10*3/MM3 (ref 0.1–0.9)
MONOCYTES NFR BLD AUTO: 10.1 % (ref 5–12)
NEUTROPHILS NFR BLD AUTO: 5.36 10*3/MM3 (ref 1.7–7)
NEUTROPHILS NFR BLD AUTO: 71.4 % (ref 42.7–76)
NRBC BLD AUTO-RTO: 0 /100 WBC (ref 0–0.2)
PLATELET # BLD AUTO: 147 10*3/MM3 (ref 140–450)
PMV BLD AUTO: 10.6 FL (ref 6–12)
POTASSIUM SERPL-SCNC: 4.1 MMOL/L (ref 3.5–5.2)
RBC # BLD AUTO: 5.63 10*6/MM3 (ref 4.14–5.8)
SODIUM SERPL-SCNC: 142 MMOL/L (ref 136–145)
WBC NRBC COR # BLD: 7.51 10*3/MM3 (ref 3.4–10.8)

## 2023-09-01 PROCEDURE — 97110 THERAPEUTIC EXERCISES: CPT

## 2023-09-01 PROCEDURE — 97130 THER IVNTJ EA ADDL 15 MIN: CPT

## 2023-09-01 PROCEDURE — 97530 THERAPEUTIC ACTIVITIES: CPT

## 2023-09-01 PROCEDURE — 97116 GAIT TRAINING THERAPY: CPT

## 2023-09-01 PROCEDURE — 82306 VITAMIN D 25 HYDROXY: CPT | Performed by: PHYSICAL MEDICINE & REHABILITATION

## 2023-09-01 PROCEDURE — 80048 BASIC METABOLIC PNL TOTAL CA: CPT | Performed by: PHYSICAL MEDICINE & REHABILITATION

## 2023-09-01 PROCEDURE — 85025 COMPLETE CBC W/AUTO DIFF WBC: CPT | Performed by: PHYSICAL MEDICINE & REHABILITATION

## 2023-09-01 PROCEDURE — 97535 SELF CARE MNGMENT TRAINING: CPT

## 2023-09-01 PROCEDURE — 97129 THER IVNTJ 1ST 15 MIN: CPT

## 2023-09-01 RX ORDER — ATORVASTATIN CALCIUM 80 MG/1
80 TABLET, FILM COATED ORAL DAILY
Qty: 30 TABLET | Refills: 1 | Status: SHIPPED | OUTPATIENT
Start: 2023-09-01 | End: 2023-09-01 | Stop reason: SDUPTHER

## 2023-09-01 RX ORDER — GUAIFENESIN 200 MG/10ML
200 LIQUID ORAL EVERY 4 HOURS PRN
Start: 2023-09-01

## 2023-09-01 RX ORDER — AMLODIPINE BESYLATE 5 MG/1
5 TABLET ORAL
Qty: 30 TABLET | Refills: 1 | Status: SHIPPED | OUTPATIENT
Start: 2023-09-01

## 2023-09-01 RX ORDER — ATORVASTATIN CALCIUM 80 MG/1
80 TABLET, FILM COATED ORAL DAILY
Qty: 30 TABLET | Refills: 1 | Status: SHIPPED | OUTPATIENT
Start: 2023-09-01

## 2023-09-01 RX ORDER — AMLODIPINE BESYLATE 5 MG/1
5 TABLET ORAL
Qty: 30 TABLET | Refills: 1 | Status: SHIPPED | OUTPATIENT
Start: 2023-09-01 | End: 2023-09-01 | Stop reason: SDUPTHER

## 2023-09-01 RX ADMIN — FUROSEMIDE 40 MG: 40 TABLET ORAL at 07:51

## 2023-09-01 RX ADMIN — METOPROLOL TARTRATE 12.5 MG: 25 TABLET, FILM COATED ORAL at 07:51

## 2023-09-01 RX ADMIN — Medication 1 TABLET: at 07:52

## 2023-09-01 RX ADMIN — ATORVASTATIN CALCIUM 80 MG: 80 TABLET, FILM COATED ORAL at 07:51

## 2023-09-01 RX ADMIN — ASPIRIN 81 MG: 81 TABLET, COATED ORAL at 07:52

## 2023-09-01 RX ADMIN — AMLODIPINE BESYLATE 5 MG: 5 TABLET ORAL at 07:52

## 2023-09-01 RX ADMIN — CLOPIDOGREL BISULFATE 75 MG: 75 TABLET, FILM COATED ORAL at 07:52

## 2023-09-01 NOTE — PLAN OF CARE
Goal Outcome Evaluation:  Plan of Care Reviewed With: patient        Progress: improving     Patient is calm and cooperative. Alert and oriented x 3. Forgetful. Word finding difficulty. Slight facial droop. He is ambulating independent in room. Using the call light for assistance. Denied pain. He is taking his medication whole with thin liquids. Participated in therapies . Discharge pending.

## 2023-09-01 NOTE — PROGRESS NOTES
Physical Medicine and Rehabilitation  Inpatient Rehabilitation Interdisciplinary Plan of Care    Demographics            Age: 82Y            Gender: Male    Admission Date: 8/30/2023 3:31:00 PM  Rehabilitation Diagnosis:  left frontal CVA- No Michael    Status post left frontal CVA August 23, 2023  Status post tPA.  Status post left middle cerebral artery thrombectomy using  stent and retriever device.  Left internal carotid artery thrombectomy using  aspiration/suction and left carotid artery angioplasty and stenting August 23, 2023    Old chronic right occipital lobe infarct.  Small chronic infarcts right thalamus  and right cerebellar hemisphere.  Occlusion of left internal carotid at the level of the bifurcation  Right common-internal carotid artery stent    Impaired mobility-improved.  Achieved mobility goals and achieve a level for  discharge home.  No further physical therapy felt needed.    Impaired self-care-improved.  Achieved self-care goals and at a level for  discharge home.  No further occupational therapy felt needed    Dysarthria-articulation exercises.  Also has some memory issues.  To continue  with outpatient speech therapy  Excellent participation with speech tx. Initial evaluation revealing moderate  cognitive impairment, mild aphasia/word finding deficits at conversational  level. Of note, baseline visual impairments (macular degeneration and decreased  peripheral vision from prior CVA) may slightly skew written and visuospatial  task results.  Discussed pt progress and baseline with son at bedside who reports possible new  decreased attention to detail and word finding, increased frustration, however  baseline visual deficits additionally impacting.  Completed mock medication task with personal MAR this date with 90% accuracy for  details of components and medication based problem solving.    Baseline left visual field deficit from prior stroke    Aortic valve replacement-23 mm Magna Ease  valve  Cardiac-coronary artery disease status post stent.  Atrial fibrillation.  Congestive heart failure.  Furosemide/metoprolol/amlodipine    30-day cardiac monitor at discharge.  One from [facility] is  already been sent to his home    Stroke prophylaxis-aspirin/Plavix/atorvastatin    Anxiety-alprazolam as needed.  Randall report reviewed    DVT prophylaxis-on dual antiplatelet therapy.  SCDs.      Plan of Care  Anticipated Discharge Date/Estimated Length of Stay: 2 weeks  Anticipated Discharge Destination: Community discharge with assistance  Discharge Plan : Patient lives with wife in 2 story home with 4 step entry.  Bedroom/full bath on second floor  D/C plan is home with wife who is available 24/7 and can physically assist.  3  adult children live close and can assist if needed.  Medical Necessity Expected Level Rationale: Goals are supervision standby  assist.  Rehabilitation prognosis fair.  Medical prognosis fair.  Intensity and Duration: an average of 3 hours/5 days per week  Medical Supervision and 24 Hour Rehab Nursing: x  Physical Therapy: x  PT Intensity/Duration: 1 hour / day, 5 days / week, for approximately 2 weeks  Occupational Therapy: x  OT Intensity/Duration: 1 hour / day, 5 days / week, for approximately 2 weeks  Speech and Language Therapy: x  SLP Intensity/Duration: 1 hour / day, 5 days / week, for approximately 2 weeks  Social Work: x  Therapeutic Recreation: x  Psychology: x  Registered Dietician: x  Updated (if changes indicated)  No changes to plan.    Based on the patient's medical and functional status, their prognosis and  expected level of functional improvement is: Goals are supervision standby  assist.  Rehabilitation prognosis fair.  Medical prognosis fair.    Interdisciplinary Problem/Goals/Status  Sphincter Control    [RN] Bowel Management(Active)  Current Status(08/31/2023): continent of bowel 100 %  Weekly Goal(09/07/2023): continent 100 %  Discharge Goal:  continent 100%    [RN] Bladder Management(Active)  Current Status(08/31/2023): 100 % continent of urine  Weekly Goal(09/07/2023): 100 % continent of urine  Discharge Goal: 100 % continent of urine        Safety    [RN] Potential for Injury(Active)  Current Status(08/31/2023): no unsafe behaviors  Weekly Goal(09/07/2023): Instruct pt./ family will be aware of fall and safety  in the setting  Discharge Goal: pt./family will be aware of risk of fall in the home setting        Psychosocial    [RN] Coping/Adjustment(Active)  Current Status(08/31/2023): Expresses appropriate coping  Weekly Goal(09/07/2023): Allow opportunity to express concerns regarding current  status  Discharge Goal: Adequate coping regarding life changes with ongoing support        Cognition    [ST] Attention(Active)  Current Status(08/31/2023): sustained attention severely impaired, multistep  directions, 0/12 0% accuracy I'ly  Weekly Goal(09/07/2023): sustained attention improved, 50% with MIN cues  Discharge Goal: functional attention for integration into home and community  settings    [ST] Memory(Active)  Current Status(08/31/2023): delayed recall following 5 minute delay 2/3 67% with  cues  Weekly Goal(09/07/2023): delayed recall following 5 minutes 3/3, 100% accuracy  MIN cues  Discharge Goal: functional memory for integration into home and community  settings    [ST] Problem Solving(Active)  Current Status(08/31/2023): functional problem solving, 2/12, 17% accuracy I'ly  Weekly Goal(09/07/2023): functional problem solving 50% MIN cues  Discharge Goal: functional problem solving for integration into home and  community settings        Mobility    [OT] Toilet Transfers(Active)  Current Status(08/31/2023): CGA  Weekly Goal(09/07/2023): SBA  Discharge Goal: Supervision    [OT] Tub/Shower Transfers(Active)  Current Status(08/31/2023): Anticipate CGA  Weekly Goal(09/07/2023): SBA  Discharge Goal: Supervision        Self Care    [OT]  Bathing(Active)  Current Status(08/31/2023): Min A  Weekly Goal(09/07/2023): CGA  Discharge Goal: SBA    [OT] Dressing (Lower)(Active)  Current Status(08/31/2023): Min A  Weekly Goal(09/07/2023): CGA  Discharge Goal: SBA    [OT] Dressing (Upper)(Active)  Current Status(08/31/2023): SBA  Weekly Goal(09/07/2023): Set up  Discharge Goal: Set up    [OT] Grooming(Active)  Current Status(08/31/2023): SBA  Weekly Goal(09/07/2023): Set up  Discharge Goal: Set up    [OT] Toileting(Active)  Current Status(08/31/2023): Min A  Weekly Goal(09/07/2023): CGA  Discharge Goal: SBA      Comments:    Signed by: Eric Joya MD

## 2023-09-01 NOTE — SIGNIFICANT NOTE
09/01/23 1341   OTHER   Discipline speech language pathologist   Rehab Time/Intention   Session Not Performed other (see comments)  (Discussed with RN. Patient with cortrak, not appropriate for swallow eval at this times. Inconsistent alertness. SLP to follow for eval as appropriate.)

## 2023-09-01 NOTE — THERAPY DISCHARGE NOTE
Inpatient Rehabilitation - Physical Therapy Treatment Note/Discharge  [facility]     Patient Name: Colin Connor  : 1940  MRN: [id number]  Today's Date: 2023                Admit Date: 2023    Visit Dx:    ICD-10-CM ICD-9-CM   1. Acute ischemic left MCA stroke  I63.512 434.91     Patient Active Problem List   Diagnosis    Acute ischemic left MCA stroke     Past Medical History:   Diagnosis Date    CHF (congestive heart failure)     Coronary artery disease     Elevated cholesterol     Hypertension     Sleep apnea     Stroke      Past Surgical History:   Procedure Laterality Date    CARDIAC CATHETERIZATION      CARDIAC SURGERY      EYE SURGERY         PT ASSESSMENT (last 12 hours)       IRF PT Evaluation and Treatment       Row Name 23          PT Time and Intention    Document Type discharge evaluation  -MD     Mode of Treatment physical therapy  -MD     Patient/Family/Caregiver Comments/Observations Pt sitting in chair showing no signs of acute distress.  -MD       Row Name 23          Pain Assessment    Pretreatment Pain Rating 0/10 - no pain  -MD       Row Name 23          Cognition/Psychosocial    Orientation Status (Cognition) oriented x 3  -MD     Follows Commands (Cognition) follows one-step commands;over 90% accuracy  -MD     Personal Safety Interventions fall prevention program maintained;gait belt  -MD       Row Name 23          Bed Mobility    Rolling Left [place] (Bed Mobility) independent  -MD     Rolling Right [place] (Bed Mobility) independent  -MD     Scooting/Bridging [place] (Bed Mobility) independent  -MD     Supine-Sit [place] (Bed Mobility) independent  -MD       Row Name 23          Sit-Stand Transfer    Sit-Stand [place] (Transfers) supervision  -MD       Row Name 23          Stand-Sit Transfer    Stand-Sit [place] (Transfers) supervision  -MD       Row Name 23           Car Transfer    Type (Car Transfer) stand pivot/stand step  -MD     [place] Level (Car Transfer) supervision  -MD       Row Name 09/01/23 0923          Gait/Stairs (Locomotion)    [place] Level (Gait) supervision  -MD     Distance in Feet (Gait) 200'x2  -MD     Pattern (Gait) step-through  -MD     Deviations/Abnormal Patterns (Gait) gait speed decreased  -MD     [place] Level (Stairs) stand by assist  -MD     Handrail Location (Stairs) none  -MD     Number of Steps (Stairs) 4  -MD     Ascending Technique (Stairs) step-over-step  -MD     Descending Technique (Stairs) step-over-step  -MD     Comment, (Gait/Stairs) in pm pt went up and down 12 steps w 1 HR on the L and supervision  -MD       Row Name 09/01/23 0923          Curb Negotiation (Mobility)    [place], Curb Negotiation supervision  -MD       Row Name 09/01/23 0923          Rough/Uneven Surface Gait Skills (Mobility)    [place], Gait on Rough/Uneven Surface (Mobility) supervision  -MD     Distance in Feet (Rough/Uneven Surface Gait) 10'x2  -MD     Comment, Gait Rough/Uneven Surface (Mobility) Pt was able to retrieve object from floor w supervision  -MD       Row Name 09/01/23 0923          Positioning and Restraints    Pre-Treatment Position sitting in chair/recliner  -MD     Post Treatment Position chair  -MD     In Chair sitting;with family/caregiver;call light within reach  -MD       Row Name 09/01/23 0923          Transfer Goal 1 (PT-IRF)    Progress/Outcomes (Transfer Goal 1, PT-IRF) goal met  -MD       Row Name 09/01/23 0923          Gait/Walking Locomotion Goal 1 (PT-IRF)    Progress/Outcomes (Gait/Walking Locomotion Goal 1, PT-IRF) goal met  -MD       Row Name 09/01/23 0923          Stairs Goal 1 (PT-IRF)    Progress/Outcomes (Stairs Goal 1, PT-IRF) goal met  -MD               User Key  (r) = Recorded By, (t) = Taken By, (c) = Cosigned By      Initials Name Provider Type    Anita Zamarripa, PT Physical Therapist                     Physical Therapy Education       Title: PT OT SLP Therapies (Resolved)       Topic: Physical Therapy (Resolved)       Point: Mobility training (Resolved)       Learner Progress:  Not documented in this visit.              Point: Home exercise program (Resolved)       Learner Progress:  Not documented in this visit.              Point: Body mechanics (Resolved)       Learner Progress:  Not documented in this visit.              Point: Precautions (Resolved)       Learning Progress Summary             Patient Acceptance, E, VU by MD at 9/1/2023 0924    Acceptance, E, VU by MD at 8/31/2023 1215                                         User Key       Initials Effective Dates Name Provider Type Discipline    MD 06/16/21 -  Anita Boswell, PT Physical Therapist PT                    PT Recommendation and Plan  Frequency of Treatment (PT): 5 times per week, 60 minutes per session           Outcome Measures       Row Name 09/01/23 0900             L[place]ton Balance Scale    Sitting to Standing 4  -MD      Standing Unsupported 4  -MD      Sitting with Back Unsupported but Feet Supported on Floor or on Stool 4  -MD      Standing to Sitting 4  -MD      Transfers 4  -MD      Standing Unsupported with Eyes Closed 4  -MD      Standing Unsupported with Feet Together 4  -MD      Reaching Forward with Outstretched Arm While Standing 4  -MD       Object From the Floor From a Standing Position 3  -MD      Turning to Look Behind Over Left and Right Shoulders While Standing 4  -MD      Turn 360 Degrees 4  -MD      Place Alternate Foot on Step or Stool While Standing Unsupported 4  -MD      Standing Unsupported with One Foot in Front 3  -MD      Standing on One Leg 3  -MD      L[place]ton Total Score 53  -MD         Dynamic Gait Index (DGI)    Gait Level Surface 3  -MD      Change in Gait Speed 3  -MD      Gait with Horizontal Head Turns 3  -MD      Gait with Vertical Head Turns 2  -MD      Gait and Pivot Turn 3  -MD      Step Over Obstacle  3  -MD      Step Around Obstacles 3  -MD      Steps 3  -MD      Dynamic Gait Index Score 23  -MD         Functional Assessment    Outcome Measure Options [place] Balance;Dynamic Gait Index  -MD                User Key  (r) = Recorded By, (t) = Taken By, (c) = Cosigned By      Initials Name Provider Type    Anita Zamarripa, PT Physical Therapist                     Time Calculation:    PT Charges       Row Name 09/01/23 1316 09/01/23 1224          Time Calculation    Start Time 1300  -MD 0900  -MD     Stop Time 1330  -MD 0930  -MD     Time Calculation (min) 30 min  -MD 30 min  -MD     PT Received On -- 09/01/23  -MD     PT - Next Appointment -- 09/02/23  -MD               User Key  (r) = Recorded By, (t) = Taken By, (c) = Cosigned By      Initials Name Provider Type    Anita Zamarripa, PT Physical Therapist                    Therapy Charges for Today       Code Description Service Date Service Provider Modifiers Qty    77786394448 HC PT EVAL LOW COMPLEXITY 3 8/31/2023 Anita Boswell, PT GP 1    89248073622 HC GAIT TRAINING EA 15 MIN 8/31/2023 Anita Boswell, PT GP 1    61867335733 HC PT THERAPEUTIC ACT EA 15 MIN 8/31/2023 Anita Boswell, PT GP 1    38454111261 HC PT THER PROC EA 15 MIN 8/31/2023 Anita Boswell, PT GP 1    80610751803 HC PT THERAPEUTIC ACT EA 15 MIN 9/1/2023 Anita Boswell, PT GP 1    59999310483 HC GAIT TRAINING EA 15 MIN 9/1/2023 Anita Boswell, PT GP 1    31858771457 HC PT THER PROC EA 15 MIN 9/1/2023 Anita Boswell, PT GP 2            PT G-Codes  Outcome Measure Options: [place] Balance, Dynamic Gait Index  L[place] Total Score: 53         Anita Boswell, DIANA  9/1/2023

## 2023-09-01 NOTE — THERAPY TREATMENT NOTE
Inpatient Rehabilitation - Speech Language Pathology Treatment Note  [facility]     Patient Name: Colin Connor  : 1940  MRN: [id number]  Today's Date: 2023               Admit Date: 2023     Visit Dx:  No diagnosis found.  Patient Active Problem List   Diagnosis    Acute ischemic left MCA stroke     Past Medical History:   Diagnosis Date    CHF (congestive heart failure)     Coronary artery disease     Elevated cholesterol     Hypertension     Sleep apnea     Stroke      Past Surgical History:   Procedure Laterality Date    CARDIAC CATHETERIZATION      CARDIAC SURGERY      EYE SURGERY         SLP Recommendation and Plan  Excellent participation with speech tx. Initial evaluation revealing moderate cognitive impairment, mild aphasia/word finding deficits at conversational level. Of note, baseline visual impairments (macular degeneration and decreased peripheral vision from prior CVA) may slightly skew written and visuospatial task results.    Discussed pt progress and baseline with son at bedside who reports possible new decreased attention to detail and word finding, increased frustration, however baseline visual deficits additionally impacting.   Completed mock medication task with personal MAR this date with 90% accuracy for details of components and medication based problem solving.   Pt would be an excellent candidate for OP and is amenable to attend if appropriate.     SLP EVALUATION (last 72 hours)       SLP SLC Evaluation       Row Name 23 1230 23 0867       Communication Assessment/Intervention    Document Type therapy note (daily note)  -AB therapy note (daily note)  -AB    Subjective Information no complaints  -AB no complaints  -AB    Patient Observations alert;agree to therapy;cooperative  -AB alert;cooperative;agree to therapy  -AB    Patient/Family/Caregiver Comments/Observations seen in room, son at bedside throughout. discussed results of CLQT and how baseline  vision may impact overall scores, however deficits in attention and word finding were precalant and appear new s/p CVA per son.  -AB seen in SLP office, has just completed OT session  -AB    Patient Effort good  -AB good  -AB    Symptoms Noted During/After Treatment none  -AB none  -AB          Pain    Additional Documentation Pain Scale: Numbers Pre/Post-Treatment (Group)  -AB Pain Scale: Numbers Pre/Post-Treatment (Group)  -AB       Pain Scale: Numbers Pre/Post-Treatment    Pretreatment Pain Rating 0/10 - no pain  -AB 0/10 - no pain  -AB    Posttreatment Pain Rating 0/10 - no pain  -AB 0/10 - no pain  -AB                 User Key  (r) = Recorded By, (t) = Taken By, (c) = Cosigned By      Initials Name Effective Dates    Blanche June, MS CCC-SLP 12/27/22 -                        EDUCATION  The patient has been educated in the following areas:     Cognitive Impairment Communication Impairment.           SLP GOALS       Row Name 09/01/23 1230 09/01/23 0830       Word Retrieval Skills Goal 1 (SLP)    Improve Word Retrieval Skills By Goal 1 (SLP) completing functional word finding tasks;80%;independently (over 90% accuracy)  -AB completing functional word finding tasks;80%;independently (over 90% accuracy)  -AB    Time Frame (Word Retrieval Goal 1, SLP) short term goal (STG)  -AB short term goal (STG)  -AB    Progress/Outcomes (Word Retrieval Goal 1, SLP) goal ongoing  -AB goal ongoing  -AB    Comment (Word Retrieval Goal 1, SLP) responsive/convergent naming with x2 attributes - 60% I'ly, can increase to 80% with MOD cues. perseveration x2  -AB responsive/convergent naming with x2 attributes - 60% I'ly, can increase to 80% with MOD cues. perseveration x2  -AB       Attention Goal 1 (SLP)    Improve Attention by Goal 1 (SLP) complete selective attention task;complete sustained attention task;80%;independently (over 90% accuracy)  -AB complete selective attention task;complete sustained attention  task;80%;independently (over 90% accuracy)  -AB    Time Frame (Attention Goal 1, SLP) short term goal (STG)  -AB short term goal (STG)  -AB    Progress/Outcomes (Attention Goal 1, SLP) goal ongoing  -AB goal ongoing  -AB       Memory Skills Goal 1 (SLP)    Improve Memory Skills Through Goal 1 (SLP) recalling related word lists with an imposed delay;recalling unrelated word lists with an imposed delay;listen to a paragraph and answer questions;80%;independently (over 90% accuracy)  -AB recalling related word lists with an imposed delay;recalling unrelated word lists with an imposed delay;listen to a paragraph and answer questions;80%;independently (over 90% accuracy)  -AB    Time Frame (Memory Skills Goal 1, SLP) short term goal (STG)  -AB short term goal (STG)  -AB    Progress/Outcomes (Memory Skills Goal 1, SLP) goal ongoing  -AB goal ongoing  -AB    Comment (Memory Skills Goal 1, SLP) delayed recall medication names with 5 minute delay - 3/3, 100% I'ly with no cues  -AB --       Functional Math Skills Goal 1 (SLP)    Improve Functional Math Skills Through Goal 1 (SLP) complete functional math task;complete word problems involving time;complete word problems involving money;80%;independently (over 90% accuracy)  -AB complete functional math task;complete word problems involving time;complete word problems involving money;80%;independently (over 90% accuracy)  -AB    Time Frame (Functional Math Skills Goal 1, SLP) short term goal (STG)  -AB short term goal (STG)  -AB    Progress/Outcomes (Functional Math Skills Goal 1, SLP) goal ongoing  -AB goal ongoing  -AB    Comment (Functional Math Skills Goal 1, SLP) --  -AB simple money math with field of 3 options - 83% I'ly, can increase to 100% with MIN cues for attention to detail  -AB       Executive Functional Skills Goal 1 (SLP)    Improve Executive Function Skills Goal 1 (SLP) complex organization/planning activity;home management activity;80%;independently (over 90%  accuracy)  -AB complex organization/planning activity;home management activity;80%;independently (over 90% accuracy)  -AB    Time Frame (Executive Function Skills Goal 1, SLP) short term goal (STG)  -AB short term goal (STG)  -AB    Progress/Outcomes (Executive Function Skills Goal 1, SLP) goal ongoing  -AB goal ongoing  -AB    Comment (Executive Function Skills Goal 1, SLP) Medication management task initiated. This session targeted: ID name, dosage, function, determine frequency and timing of medications. Accomplished x8 meds - with 5 components evaluated (name, dosage, function, timing, frequency) with 36/40, 90% accuracy with minimal cues. Intermittent difficulty with function of medication and times daily.  Problem solving for medications completed (i.e. “when would you take this medication if it makes you drowsy?) - overall, 90% accuracy I’ly for verbal scenarios. pt rates overall task as medium difficulty, increased time required from baseline  -AB deduction elimination task for personally salient items (tools pt has at home) - field of 6, 100% with MOD cues required. prior to cues perseverates and difficulty with inclusion versus exclusion  -AB                User Key  (r) = Recorded By, (t) = Taken By, (c) = Cosigned By      Initials Name Provider Type    Blanche June, MS CCC-SLP Speech and Language Pathologist                            Time Calculation:      Time Calculation- SLP       Row Name 09/01/23 1322 09/01/23 1015          Time Calculation- SLP    SLP Start Time 1230  -AB 0830  -AB     SLP Stop Time 1300  -AB 0900  -AB     SLP Time Calculation (min) 30 min  -AB 30 min  -AB     SLP Received On -- 09/01/23  -AB               User Key  (r) = Recorded By, (t) = Taken By, (c) = Cosigned By      Initials Name Provider Type    Blanche June, MS CCC-SLP Speech and Language Pathologist                    Therapy Charges for Today       Code Description Service Date Service Provider Modifiers  Qty    13092747282  ST STD COG PERF TEST PER HOUR 8/31/2023 Blanche Snell, MS CCC-SLP GN 2    [id number]  ST DEV OF COGN SKILLS INITIAL 15 MIN 9/1/2023 Blanche Snell, MS CCC-SLP  1    [id number]  ST DEV OF COGN SKILLS EACH ADDT'L 15 MIN 9/1/2023 Blanche Snell, MS CCC-SLP  3                       Blanche Snell MS CCC-SLP  9/1/2023

## 2023-09-01 NOTE — PROGRESS NOTES
SECTION GG    Eating Performance Discharge: Patient completed the activities by themself with  no assistance from a helper.    Section B. Health Literacy  Frequency of Needing Assistance Reading:  Often    Section D. Mood  Presence of little interest or pleasure in doing things:   No  Frequency of having little interest or pleasure in doing things:   Never or 1  day  Presence of feeling down, depressed, or hopeless:   No  Frequency of feeling down, depressed, or hopeless:   Never or 1 day   Interview Ended. Above responses do not meet criteria to continue  Total Severity Score:   0    Section D. Social Isolation  Frequency of Feeling Lonely or Isolated:  Never    Section J. Health Conditions (Pain Effect on Sleep)  Pain Effect on Sleep:   Does not apply - Patient has not had any pain or hurting  in the past 5 days    Signed by: Massiel Chew RN

## 2023-09-01 NOTE — PROGRESS NOTES
Inpatient Rehabilitation Discharge  Section A. Transportation  Issues Due to Lack of Transportation:  No    Section A. Medication List  Medication List to Subsequent Provider:  Not applicable.  Patient was not  discharged to a subsequent provider.  Discharge Location:  01 - Home  Medication List to Patient at Discharge:  Yes - Current reconciled medication  list provided to the patient, family and/or caregiver  Route(s) of Medication List Transmission to Patient:  Electronic Health Record,  Verbal (e.g., in-person, telephone, video conferencing), Paper-based (e.g., fax,  copies, printouts)    Signed by: ELIESER Leong

## 2023-09-01 NOTE — PROGRESS NOTES
"Section C. BIMS  Brief Interview for Mental Status (BIMS) was conducted.  Repetition of Three Words: Three words  Able to report correct year: Correct  Able to report correct month: Accurate within 5 days  Able to report correct day of the week: Correct  Able to recall \"sock\": Yes, after cuing  Able to recall \"blue\": Yes, after cuing  Able to recall \"bed\": No, could not recall    BIMS SUMMARY SCORE: 11 Moderately impaired    Section C. Signs and Symptoms of Delirium (from CAM)  Acute Change in Mental Status:   No  Inattention:   Behavior not present  Disorganized Thinking:   Behavior not present  Altered Level of Consciousness:   Behavior not present    Signed by: Blanche Snell, SLP    "

## 2023-09-01 NOTE — DISCHARGE SUMMARY
[facility] - REHABILITATION UNIT    DEBRA MARROQUIN  1940    ADMIT DATE:  8/30/2023  3:31 PM  DISCHARGE DATE:  09/01/23      CHIEF COMPLAINT:    Status post left frontal CVA     HISTORY OF PRESENT ILLNESS:    Patient is 82-year-old male who presented to [facility] with right-sided weakness and aphasia on August 23, 2023.  Also had right facial drooping.  Garbled speech.  Acute left internal carotid artery occlusion secondary to ruptured internal carotid artery plaque and left MCA thrombus and occlusion.  He received tPA.  He underwent emergent endovascular mechanical thrombectomy of the left internal carotid artery and middle cerebral artery and left carotid artery angioplasty and stenting.  He is to continue aspirin Plavix and statin for stroke prophylaxis.  For hypertension continued amlodipine and Lopressor.  Hyperlipidemia-high intensity statin.  Chronic diastolic congestive heart failure..  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  Continues on Lasix 40 mg daily.  History of aortic valve replacement 2018 with perioperative atrial fibrillation.  Remained in normal sinus rhythm during his recent hospital stay.     He has had mild aphasia.  Moving his extremities.  With speech therapy-[Aphasia Diagnosis Mild expressive deficits;Mild receptive deficits   Apraxia Diagnosis Verbal apraxia;Mild apraxia   Dysarthria Diagnosis Mild dysarthria   Tolerance to Treatment Patient tolerated treatment well   Problem List Impaired Intelligibility/speech;Impaired communication;Impaired comprehension;Impaired language   Comment Patient tolerated treatment well. Improvement in expressive language for conversation and verbal descriptions this date. No initial phrase repetitions or difficulty with initiation observed. Semantic paraphasias x2 corrected with cues to make inference about scene. Perseveration of one item x1 from previous scene. Suspect visual scenes description and reading accuracy impacted slightly by visual acuity  changes from patient's macular degeneration. When paragraph presented in larger font phonemic errors were still noted. Required cues to slow rate of reading and focus on individual words to increase reading accuracy. Family and patient report overall improvement in articulation however slight reduction of articulatory precision still noted. Benefited from cues for over articulation when producing alliterative phrases. ]    With physical therapy-[Bed Mobility/Transfers   Sit to Stand Supervision/Standby assist   Stand to Sit Supervision/Standby assist   Technique Stand pivot   Trials/Comments 5xSTS: 17s, SBA     Ambulation   Ambulation Assistance Contact guard;Supervision/Standby assist;With cues   Quality of Gait Decreased ramses;Narrow base of support;Cues for increased hip/knee flexion;Cues for step through;Decreased arm swing   Loss of Balance (# of times) 1 times   Distance Ambulated (ft) -- 40' intervals, multiple reps, c focus on increased scanning, stops/starts and route-finding   Ambulation Safety Fair   Gait Comment TUs ]  With Occupational Therapy-[Bed Mobility/Transfers   Supine to Sit Supervision/Standby assist;With side rail/trapeze   Sit to Stand Supervision/Standby assist   Stand to Sit Supervision/Standby assist   Functional Mobility Supervision/Standby assist   Transfer Device -- gait belt   Trials/Comments Pt completing transfer from bed to chair, pt with mild unsteadiness     Balance   Static Sitting Good   Dynamic Sitting Good   Static Standing Fair   Dynamic Standing Fair ]     Given his functional impairments and comorbidities he is now admitted for acute inpatient rehabilitation    HOSPITAL COURSE:    Patient participated in a comprehensive acute inpatient rehabilitation program.     During the hospital stay the following areas were addressed:      Status post left frontal CVA 2023  Status post tPA.  Status post left middle cerebral artery thrombectomy using stent and  retriever device.  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  Small chronic infarcts right thalamus and right cerebellar hemisphere.  Occlusion of left internal carotid at the level of the bifurcation  Right common-internal carotid artery stent     Impaired mobility-improved.  Achieved mobility goals and achieve a level for discharge home.  No further physical therapy felt needed.    Impaired self-care-improved.  Achieved self-care goals and at a level for discharge home.  No further occupational therapy felt needed     Dysarthria-articulation exercises.  Also has some memory issues.  To continue with outpatient speech therapy  Excellent participation with speech tx. Initial evaluation revealing moderate cognitive impairment, mild aphasia/word finding deficits at conversational level. Of note, baseline visual impairments (macular degeneration and decreased peripheral vision from prior CVA) may slightly skew written and visuospatial task results.    Discussed pt progress and baseline with son at bedside who reports possible new decreased attention to detail and word finding, increased frustration, however baseline visual deficits additionally impacting.   Completed mock medication task with personal MAR this date with 90% accuracy for details of components and medication based problem solving.      Baseline left visual field deficit from prior stroke     Aortic valve replacement-23 mm Magna Ease valve  Cardiac-coronary artery disease status post stent.  Atrial fibrillation.  Congestive heart failure.  Furosemide/metoprolol/amlodipine     30-day cardiac monitor at discharge.  One from [facility] is already been sent to his home     Stroke prophylaxis-aspirin/Plavix/atorvastatin     Anxiety-alprazolam as needed.  Randall report reviewed     DVT prophylaxis-on dual  antiplatelet therapy.  SCDs.     Goal is for home with outpatient speech therapies.  Barrier to discharge:.  Mobility and self-care- work on, transfers, progressive ambulation, ADLs to overcome    Disposition-symptom 1-achieved inpatient rehabilitation goals.  Medically stable.  Family teaching completed.  Discharge home today with outpatient speech therapy.  No further physical therapy or occupational therapy felt needed    Physical Exam near the time of discharge:    MENTAL STATUS -  AWAKE / ALERT  HEENT- NCAT, PUPILS EQUALLY ROUND, SCLERAE ANICTERIC, CONJUNCTIVAE PINK, OP MOIST, NO JVD, EARS UNREMARKABLE EXTERNALLY  LUNGS - CTA, NO WHEEZES, RALES OR RHONCHI  HEART- RRR with occasional skipped beat, NO RUB, MURMUR, OR GALLOP  ABD - NORMOACTIVE BOWEL SOUNDS, SOFT, NT.   EXT - NO EDEMA OR CYANOSIS  NEURO -oriented person place time and situation.    Speech was fluent  Mild right facial droop  Dysarthria  MOTOR EXAM -patient resistance well with bilateral shoulder flexion, elbow flexion, left finger flexion with mild weakness with right finger flexion, and takes resistance with bilateral knee extension and ankle dorsiflexion.  Impaired dexterity bilaterally    RESULTS:  No results found for: POCGLU  Results from last 7 days   Lab Units 09/01/23  0955   WBC 10*3/mm3 7.51   HEMOGLOBIN g/dL 17.9*   HEMATOCRIT % 52.1*   PLATELETS 10*3/mm3 147     Results from last 7 days   Lab Units 09/01/23  0955   SODIUM mmol/L 142   POTASSIUM mmol/L 4.1   CHLORIDE mmol/L 104   CO2 mmol/L 26.8   BUN mg/dL 18   CREATININE mg/dL 1.21   CALCIUM mg/dL 9.2   GLUCOSE mg/dL 106*              Discharge Medications        New Medications        Instructions Start Date   amLODIPine 5 MG tablet  Commonly known as: NORVASC   5 mg, Oral, Every 24 Hours Scheduled      guaifenesin 100 MG/5ML liquid  Commonly known as: ROBITUSSIN   200 mg, Oral, Every 4 Hours PRN             Changes to Medications        Instructions Start Date   atorvastatin 80 MG  tablet  Commonly known as: LIPITOR  What changed:   medication strength  how much to take   80 mg, Oral, Daily      metoprolol tartrate 25 MG tablet  Commonly known as: LOPRESSOR  What changed: how much to take   12.5 mg, Oral, 2 Times Daily             Continue These Medications        Instructions Start Date   ALPRAZolam 0.5 MG tablet  Commonly known as: XANAX   0.5 mg, Oral, Daily PRN      aspirin 81 MG chewable tablet   81 mg, Oral, Daily      clopidogrel 75 MG tablet  Commonly known as: PLAVIX   75 mg, Oral, Daily      furosemide 40 MG tablet  Commonly known as: LASIX   40 mg, Oral, Daily      ICAPS AREDS 2 PO   1 tablet/day, Oral, Daily (Monday-Friday)                Follow-up Information       Wilfredo Mart MD. Go on 10/4/2023.    Specialty: Neurosurgery  Why: at 11am- 10/4/2023  Contact information:  [address]  [place] [address]  [phone]               Riki Isabel MD. Schedule an appointment as soon as possible for a visit in 2 week(s).    Specialty: Family Medicine  Why: Office called--will put patient on cancellation list for other provider since Dr. Isabel out on medical leave. Will call patient when can get him in.  Contact information:  [address]  [facility] [address]  [place] [id number] [phone]               30-day cardiac monitor at discharge through [facility].               Eric Elizabeth MD Follow up.    Specialty: Cardiology  Why: 9/29/23  11:30am  Contact information:  [address] [facility] [address]  [place] [facility] [id number] [phone]               Eric Joya MD Follow up.    Specialty: Physical Medicine and Rehabilitation  Why: 11/22/23 @ 8:30am  Contact information:  [address] [facility] [address]  [place] [address]  [phone]               [facility] ACUTE REHAB PROGRAM. Go on 9/6/2023.    Specialty: Inpatient Rehabilitation Facility  Why: Outpatient Speech Therapy on Wednesday, 9/6 at 9:30 a.m. Please arrive 10  minutes early. Come in through Entrance B and take elevator to 4th floor.  Contact information:  [address] [facility]  [facility] [address]  [phone]             Yumi Quintero Follow up.    Why: Sept. 14, 2023 @ 7:30am  (Neurologist that saw you in hospital)  Contact information:  [address] [facility]  [address] [place] [address]    [phone]             Ian Rahman MD Follow up.    Specialty: Cardiology  Why: This is cardiologist that saw you in hospital. Call for appointment if you want to switch cardiologists.  Contact information:  [address]  [facility]  [facility] [address]  [phone]                             AVS and Handouts       Admission (Current) (8/30/23)   - [facility] REHABILITATION Principal Problem: Acute ischemic left MCA stroke      AVS - Discharge (for patient and transfer facility) Printed on 9/1/23 at  3:44 PM    Rehabilitation After A Stroke  Adult (English)     Amlodipine Tablets (English)     Speech-language Therapy After A Stroke (English)     Atorvastatin Tablets (English)     Stroke Prevention  Easy-to-read (English)     Metoprolol Tablets (English)                   Discharge Instructions         30-day cardiac monitor at discharge through [facility]    - follow-up provider order for Dr. Eric Elizabeth-[place] [facility]-for 30-day event monitor per discharging physicians from [facility].  - follow-up with Dr. Eric Joya-physical medicine and rehabilitation-as needed for any rehab issues.  -follow-up provider-[place] [facility]    No driving.  No alcohol.  Patient will need to follow-up with [place] neurolog[facility] regarding any clearance for driving    Outpatient speech therapy [facility]                >30 on discharge    Eric Joya MD

## 2023-09-01 NOTE — THERAPY DISCHARGE NOTE
Inpatient Rehabilitation - Speech Language Pathology Treatment Note/Discharge  [facility]     Patient Name: Colin Connor  : 1940  MRN: [id number]  Today's Date: 2023               Admit Date: 2023     Visit Dx:    ICD-10-CM ICD-9-CM   1. Acute ischemic left MCA stroke  I63.512 434.91     Patient Active Problem List   Diagnosis    Acute ischemic left MCA stroke     Past Medical History:   Diagnosis Date    CHF (congestive heart failure)     Coronary artery disease     Elevated cholesterol     Hypertension     Sleep apnea     Stroke      Past Surgical History:   Procedure Laterality Date    CARDIAC CATHETERIZATION      CARDIAC SURGERY      EYE SURGERY         SLP Recommendation and Plan    Anticipated Discharge Disposition (SLP): home with OP services (23)       Reason for Discharge: discharge from this facility (23)    SLP Discharge Summary  Anticipated Discharge Disposition (SLP): home with OP services  Reason for Discharge: discharge from this facility  Progress Toward Achieving Short/long Term Goals: goals not met within established timelines  Discharge Destination: home w/ OP services    Excellent participation with speech tx - x1 session following initial eval 8.31. Initial evaluation including CLQT revealing moderate cognitive impairment, mild aphasia/word finding deficits at conversational level. Of note, baseline visual impairments (macular degeneration and decreased peripheral vision from prior CVA) may slightly skew written and visuospatial task results.    Discussed pt progress and baseline with son at bedside who reports possible new decreased attention to detail and word finding, increased frustration, however baseline visual deficits additionally impacting.   Completed mock medication task with personal MAR this date with 90% accuracy for details of components and medication based problem solving.   Pt tentatively scheduled for OP SLP services, amenable to  all recommendations and plan of care.     Blanche Snell, MS CCC-SLP  9/1/2023

## 2023-09-01 NOTE — THERAPY DISCHARGE NOTE
Inpatient Rehabilitation - IRF Occupational Therapy Treatment Note/Discharge  [facility]     Patient Name: Colin Connor  : 1940  MRN: [id number]  Today's Date: 2023               Admit Date: 2023     No diagnosis found.  Patient Active Problem List   Diagnosis    Acute ischemic left MCA stroke     Past Medical History:   Diagnosis Date    CHF (congestive heart failure)     Coronary artery disease     Elevated cholesterol     Hypertension     Sleep apnea     Stroke      Past Surgical History:   Procedure Laterality Date    CARDIAC CATHETERIZATION      CARDIAC SURGERY      EYE SURGERY         IRF OT ASSESSMENT FLOWSHEET (last 12 hours)       IRF OT Evaluation and Treatment       Row Name 23 1349          OT Time and Intention    Document Type discharge evaluation;daily treatment  -AF     Mode of Treatment occupational therapy  -AF     Patient Effort good  -AF     Symptoms Noted During/After Treatment none  -AF       Row Name 23 1343          General Information    Patient/Family/Caregiver Comments/Observations pt sitting up in recliner chair in room, son present in PM for teaching, pt deferred ADL this AM  -AF     Existing Precautions/Restrictions fall  -AF       Row Name 23 1348          Pain Assessment    Pretreatment Pain Rating 0/10 - no pain  -AF     Posttreatment Pain Rating 0/10 - no pain  -AF       Row Name 23 1344          Cognition/Psychosocial    Affect/Mental Status (Cognition) --  mild word finding difficulties  -AF     Orientation Status (Cognition) oriented x 3  -AF     Follows Commands (Cognition) follows one-step commands;over 90% accuracy  -AF     Personal Safety Interventions fall prevention program maintained;gait belt;nonskid shoes/slippers when out of bed  -AF     Comment, Cognition required MIN vc's for peg board design starting  -AF       Row Name 23 1344          Vision Assessment/Intervention    Vision Assessment Comment pt participatedin  visual scanning tasks with MIN difficutlies after set up  -AF       Row Name 09/01/23 1349          Bathing    Comment (Bathing) deferred  -AF       Row Name 09/01/23 1349          Lower Body Dressing    [place] Level (Lower Body Dressing) doff;don;socks;set up  -AF     Comment (Lower Body Dressing) seated in recliner chair, states he has a certian chair he sits in at home that is easier for him to don/doff socks  -AF       Row Name 09/01/23 1349          Functional Mobility    Functional Mobility- Comment walked to and from therapy gym and room and around room supervision  -AF       Row Name 09/01/23 1349          Sit-Stand Transfer    Sit-Stand [place] (Transfers) supervision  -AF       Row Name 09/01/23 1349          Stand-Sit Transfer    Stand-Sit [place] (Transfers) supervision  -AF       Row Name 09/01/23 1349          Toilet Transfer    Type (Toilet Transfer) sit-stand;stand-sit  -AF     [place] Level (Toilet Transfer) supervision  -AF       Row Name 09/01/23 1349          Motor Skills    Results, 9 Hole Peg Test of Fine Motor Coordination MIN diffiuclty with maniputing small pegs in peg board  -AF       Row Name 09/01/23 1349          Shoulder (Therapeutic Exercise)    Shoulder Strengthening (Therapeutic Exercise) bilateral;flexion;extension;scapular stabilization;sitting;15 repititions;2 sets  #2.5 dowel carlos  -AF       Row Name 09/01/23 1349          Elbow/Forearm (Therapeutic Exercise)    Elbow/Forearm Strengthening (Therapeutic Exercise) bilateral;extension;flexion;15 repititions;2 sets  #2.5 dowel carlos  -AF       [facility] Name 09/01/23 1349          Hand (Therapeutic Exercise)    Hand (Therapeutic Exercise) strengthening exercise  -AF     Hand Strengthening (Therapeutic Exercise) bilateral; strengthening;hand gripper;15 repititions;2 sets  -AF       Row Name 09/01/23 1349          Balance    Static Sitting Balance independent  -AF     Dynamic Sitting Balance independent  -AF     Static  Standing Balance independent;supervision  -AF     Dynamic Standing Balance supervision  -AF       Row Name 09/01/23 1349          Positioning and Restraints    Pre-Treatment Position sitting in chair/recliner  -AF     Post Treatment Position chair  -AF     In Chair sitting;with family/caregiver;with SLP  with SLP in AM and with son in PM  -AF               User Key  (r) = Recorded By, (t) = Taken By, (c) = Cosigned By      Initials Name Effective Dates    AF Lesly Hahn, OTR 06/16/21 -                        Occupational Therapy Education       Title: PT OT SLP Therapies (In Progress)       Topic: Occupational Therapy (Done)       Point: ADL training (Done)       Description:   Instruct learner(s) on proper safety adaptation and remediation techniques during self care or transfers.   Instruct in proper use of assistive devices.                  Learning Progress Summary             Patient Acceptance, E, VU by AF at 9/1/2023 1354    Comment: pt and son participated in teaching with discussion over activities for home, using the shower chair at home initally, safety with kitchen tasks and BADL tasks. Pt agreeable to this plan, states that he will do his best at home and will follow up SLP    Acceptance, E, VU,DU by KA at 8/31/2023 1505   Family Acceptance, E, VU by AF at 9/1/2023 1354    Comment: pt and son participated in teaching with discussion over activities for home, using the shower chair at home initally, safety with kitchen tasks and BADL tasks. Pt agreeable to this plan, states that he will do his best at home and will follow up SLP                         Point: Home exercise program (Done)       Description:   Instruct learner(s) on appropriate technique for monitoring, assisting and/or progressing therapeutic exercises/activities.                  Learning Progress Summary             Patient Acceptance, E, VU by AF at 9/1/2023 1354    Comment: pt and son participated in teaching with discussion over  activities for home, using the shower chair at home initally, safety with kitchen tasks and BADL tasks. Pt agreeable to this plan, states that he will do his best at home and will follow up SLP    Acceptance, E, VU,DU by SHAD at 8/31/2023 1505   Family Acceptance, E, VU by AF at 9/1/2023 1354    Comment: pt and son participated in teaching with discussion over activities for home, using the shower chair at home initally, safety with kitchen tasks and BADL tasks. Pt agreeable to this plan, states that he will do his best at home and will follow up SLP                         Point: Precautions (Done)       Description:   Instruct learner(s) on prescribed precautions during self-care and functional transfers.                  Learning Progress Summary             Patient Acceptance, E, VU by AF at 9/1/2023 1354    Comment: pt and son participated in teaching with discussion over activities for home, using the shower chair at home initally, safety with kitchen tasks and BADL tasks. Pt agreeable to this plan, states that he will do his best at home and will follow up SLP    Acceptance, E, VU,DU by SHAD at 8/31/2023 1505   Family Acceptance, E, VU by AF at 9/1/2023 1354    Comment: pt and son participated in teaching with discussion over activities for home, using the shower chair at home initally, safety with kitchen tasks and BADL tasks. Pt agreeable to this plan, states that he will do his best at home and will follow up SLP                         Point: Body mechanics (Done)       Description:   Instruct learner(s) on proper positioning and spine alignment during self-care, functional mobility activities and/or exercises.                  Learning Progress Summary             Patient Acceptance, E, VU by AF at 9/1/2023 1354    Comment: pt and son participated in teaching with discussion over activities for home, using the shower chair at home initally, safety with kitchen tasks and BADL tasks. Pt agreeable to this plan,  states that he will do his best at home and will follow up SLP    Acceptance, ESCOTT,DU by KA at 8/31/2023 1505   Family Acceptance, RICO, SCOTT by AF at 9/1/2023 1354    Comment: pt and son participated in teaching with discussion over activities for home, using the shower chair at home initally, safety with kitchen tasks and BADL tasks. Pt agreeable to this plan, states that he will do his best at home and will follow up SLP                                         User Key       Initials Effective Dates Name Provider Type Discipline    AF 06/16/21 -  Lesly Hahn, OTR Occupational Therapist OT    SHAD 09/22/22 -  Verenice Santos OT Occupational Therapist OT                    OT Recommendation and Plan  Planned Therapy Interventions (OT): activity tolerance training, BADL retraining, functional balance retraining, occupation/activity based interventions, strengthening exercise, transfer/mobility retraining           OT IRF GOALS       Row Name 09/01/23 1355 08/31/23 1134          Transfer Goal 1 (OT-IRF)    Activity/Assistive Device (Transfer Goal 1, OT-IRF) -- toilet;shower chair  -KA     [place] Level (Transfer Goal 1, OT-IRF) -- standby assist  -KA     Time Frame (Transfer Goal 1, OT-IRF) -- short-term goal (STG)  -KA     Progress/Outcomes (Transfer Goal 1, OT-IRF) goal met  -AF goal ongoing  -KA        Transfer Goal 2 (OT-IRF)    Activity/Assistive Device (Transfer Goal 2, OT-IRF) -- toilet;shower chair  -KA     [place] Level (Transfer Goal 2, OT-IRF) -- supervision required  -KA     Time Frame (Transfer Goal 2, OT-IRF) -- long-term goal (LTG)  -KA     Progress/Outcomes (Transfer Goal 2, OT-IRF) goal met  -AF goal ongoing  -KA        Bathing Goal 1 (OT-IRF)    Activity/Device (Bathing Goal 1, OT-IRF) -- bathing skills, all  -KA     [place] Level (Bathing Goal 1, OT-IRF) -- contact guard required  -KA     Time Frame (Bathing Goal 1, OT-IRF) -- short-term goal (STG)  -KA     Progress/Outcomes (Bathing  Goal 1, OT-IRF) goal met  -AF goal ongoing  -KA        Bathing Goal 2 (OT-IRF)    Activity/Device (Bathing Goal 2, OT-IRF) -- bathing skills, all  -KA     [place] Level (Bathing Goal 2, OT-IRF) -- standby assist  -KA     Time Frame (Bathing Goal 2, OT-IRF) -- long-term goal (LTG)  -KA     Progress/Outcomes (Bathing Goal 2, OT-IRF) goal met  -AF goal ongoing  -KA        UB Dressing Goal 1 (OT-IRF)    Activity/Device (UB Dressing Goal 1, OT-IRF) -- upper body dressing  -KA     [place] (UB Dress Goal 1, OT-IRF) -- set-up required  -KA     Time Frame (UB Dressing Goal 1, OT-IRF) -- short-term goal (STG)  -KA     Progress/Outcomes (UB Dressing Goal 1, OT-IRF) goal met  -AF goal ongoing  -KA        UB Dressing Goal 2 (OT-IRF)    Activity/Device (UB Dressing Goal 2, OT-IRF) -- upper body dressing  -KA     [place] (UB Dress Goal 2, OT-IRF) -- set-up required  -KA     Time Frame (UB Dressing Goal 2, OT-IRF) -- long-term goal (LTG)  -KA     Progress/Outcomes (UB Dressing Goal 2, OT-IRF) goal met  -AF goal ongoing  -KA        LB Dressing Goal 1 (OT-IRF)    Activity/Device (LB Dressing Goal 1, OT-IRF) -- lower body dressing  -KA     [place] (LB Dressing Goal 1, OT-IRF) -- minimum assist (75% or more patient effort)  -KA     Time Frame (LB Dressing Goal 1, OT-IRF) -- short-term goal (STG)  -KA     Progress/Outcomes (LB Dressing Goal 1, OT-IRF) goal met  -AF goal ongoing  -KA        LB Dressing Goal 2 (OT-IRF)    Activity/Device (LB Dressing Goal 2, OT-IRF) -- lower body dressing  -KA     [place] (LB Dressing Goal 2, OT-IRF) -- contact guard required  -KA     Time Frame (LB Dressing Goal 2, OT-IRF) -- long-term goal (LTG)  -KA     Progress/Outcomes (LB Dressing Goal 2, OT-IRF) goal met  -AF goal ongoing  -KA        Grooming Goal 1 (OT-IRF)    Activity/Device (Grooming Goal 1, OT-IRF) -- grooming skills, all  -KA     [place] (Grooming Goal 1, OT-IRF) -- contact guard required  -KA     Time Frame  (Grooming Goal 1, OT-IRF) -- short-term goal (STG)  -KA     Progress/Outcomes (Grooming Goal 1, OT-IRF) goal met  -AF goal ongoing  -KA        Grooming Goal 2 (OT-IRF)    Activity/Device (Grooming Goal 2, OT-IRF) -- grooming skills, all  -KA     [place] (Grooming Goal 2, OT-IRF) -- supervision required  -KA     Time Frame (Grooming Goal 2, OT-IRF) -- long-term goal (LTG)  -KA     Progress/Outcomes (Grooming Goal 2, OT-IRF) goal met  -AF goal ongoing  -KA        Toileting Goal 1 (OT-IRF)    Activity/Device (Toileting Goal 1, OT-IRF) -- toileting skills, all  -KA     [place] Level (Toileting Goal 1, OT-IRF) -- contact guard required  -KA     Progress/Outcomes (Toileting Goal 1, OT-IRF) goal met  -AF goal ongoing  -KA     Time Frame (Toileting Goal 1, OT-IRF) -- short-term goal (STG)  -KA        Toileting Goal 2 (OT-IRF)    Activity/Device (Toileting Goal 2, OT-IRF) -- toileting skills, all  -KA     [place] Level (Toileting Goal 2, OT-IRF) -- standby assist  -KA     Progress/Outcomes (Toileting Goal 2, OT-IRF) goal met  -AF goal ongoing  -KA     Time Frame (Toileting Goal 2, OT-IRF) -- long-term goal (LTG)  -KA        Strength Goal 1 (OT-IRF)    Strength Goal 1 (OT-IRF) -- Pt to demo understanding of HEP in order to improve overall endurance and strength required for ADLs  -KA     Time Frame (Strength Goal 1, OT-IRF) -- long-term goal (LTG)  -KA     Progress/Outcomes (Strength Goal 1, OT-IRF) goal met  -AF goal ongoing  -KA        Caregiver Training Goal 1 (OT-IRF)    Caregiver Training Goal 1 (OT-IRF) -- Pt's family to demo understanding of safety with transfers and ADLs for safe dc home.  -KA     Time Frame (Caregiver Training Goal 1, OT-IRF) -- long-term goal (LTG)  -KA     Progress/Outcomes (Caregiver Training Goal 1, OT-IRF) goal met  -AF goal ongoing  -KA               User Key  (r) = Recorded By, (t) = Taken By, (c) = Cosigned By      Initials Name Provider Type    Lesly Burciaga, OTR  Occupational Therapist    Verenice Goldsmith OT Occupational Therapist                     Outcome Measures       Row Name 09/01/23 0900             [place] Balance Scale    Sitting to Standing 4  -MD      Standing Unsupported 4  -MD      Sitting with Back Unsupported but Feet Supported on Floor or on Stool 4  -MD      Standing to Sitting 4  -MD      Transfers 4  -MD      Standing Unsupported with Eyes Closed 4  -MD      Standing Unsupported with Feet Together 4  -MD      Reaching Forward with Outstretched Arm While Standing 4  -MD       Object From the Floor From a Standing Position 3  -MD      Turning to Look Behind Over Left and Right Shoulders While Standing 4  -MD      Turn 360 Degrees 4  -MD      Place Alternate Foot on Step or Stool While Standing Unsupported 4  -MD      Standing Unsupported with One Foot in Front 3  -MD      Standing on One Leg 3  -MD      [place] Total Score 53  -MD         Dynamic Gait Index (DGI)    Gait Level Surface 3  -MD      Change in Gait Speed 3  -MD      Gait with Horizontal Head Turns 3  -MD      Gait with Vertical Head Turns 2  -MD      Gait and Pivot Turn 3  -MD      Step Over Obstacle 3  -MD      Step Around Obstacles 3  -MD      Steps 3  -MD      Dynamic Gait Index Score 23  -MD         Functional Assessment    Outcome Measure Options [place] Balance;Dynamic Gait Index  -MD                User Key  (r) = Recorded By, (t) = Taken By, (c) = Cosigned By      Initials Name Provider Type    Anita Zamarripa, PT Physical Therapist                    Time Calculation:    Time Calculation- OT       Row Name 09/01/23 1356 09/01/23 1355          Time Calculation- OT    OT Start Time 1330  -AF 0800  -AF     OT Stop Time 1345  -AF 0830  -AF     OT Time Calculation (min) 15 min  -AF 30 min  -AF               User Key  (r) = Recorded By, (t) = Taken By, (c) = Cosigned By      Initials Name Provider Type    Lesly Burciaga, OTR Occupational Therapist                    Therapy Charges for  Today       Code Description Service Date Service Provider Modifiers Qty    [id number]  OT SELF CARE/MGMT/TRAIN EA 15 MIN 9/1/2023 Lesly Hahn, SARAR GO 1    [id number]  OT THER PROC EA 15 MIN 9/1/2023 Lesly Hahn, OTR GO 1    [id number]  OT THERAPEUTIC ACT EA 15 MIN 9/1/2023 Lesly Hahn, OTR GO 1                 OT Discharge Summary  Reason for Discharge: Independent, Maximum functional potential achieved, At baseline function, Discharge from facility  Outcomes Achieved: Able to achieve all goals within established timeline  Discharge Destination: Home, Home with assist    NENITA Saavedra  9/1/2023

## 2023-09-01 NOTE — PROGRESS NOTES
Case Management Discharge Note      Final Note: Patient to d/c home today, 9/1, with wife and assistance from son and daughter as needed. Son, Seth, here this afternoon for teaching with PT, OT, ST prior to d/c and he will transport patient home.  Patient scheduled to start outpatient ST here at [facility] on Wednesday, 9/6 at 9:30 a.m.         Selected Continued Care - Admitted Since 8/30/2023       Destination    No services have been selected for the patient.                Durable Medical Equipment    No services have been selected for the patient.                Dialysis/Infusion    No services have been selected for the patient.                Home Medical Care    No services have been selected for the patient.                Therapy    No services have been selected for the patient.                Community Resources    No services have been selected for the patient.                Community & DME    No services have been selected for the patient.                         Final Discharge Disposition Code: 01 - home or self-care

## 2023-09-01 NOTE — PLAN OF CARE
Goal Outcome Evaluation:  Plan of Care Reviewed With: patient        Progress: improving  Outcome Evaluation: Patient slept well tonight. AOx 2-3, calm and cooperative but forgetful at times. Right sided weakness with aphasia. Facial droop, slight noted; has difficulty finding words, speech issues. With left visual field deficit from previous stroke. Meds whole with water. Continent of B/B, last BM 08/31. Assist x1. No complains at present.

## 2023-09-01 NOTE — PROGRESS NOTES
SECTION GG      Mobility Performance Discharge:     Roll Left and Right: Patient completed the activities by themself with no  assistance from a helper.   Sit to Lying: Patient completed the activities by themself with no assistance  from a helper.   Lying to Sitting on Side of Bed: Patient completed the activities by themself  with no assistance from a helper.   Sit to Stand: [place] provides verbal cues and/or touching/steadying and/or  contact guard assistance as patient completes activity. Assistance may be  provided throughout the activity or intermittently.   Chair/Bed to Chair Transfer: [place] provides verbal cues and/or  touching/steadying and/or contact guard assistance as patient completes  activity. Assistance may be provided throughout the activity or intermittently.   Car Transfer: [place] provides verbal cues and/or touching/steadying and/or  contact guard assistance as patient completes activity. Assistance may be  provided throughout the activity or intermittently.   Walk 10 Feet:   [place] provides verbal cues and/or touching/steadying and/or  contact guard assistance as patient completes activity. Assistance may be  provided throughout the activity or intermittently.  Walk 50 Feet with 2 Turns:   [place] provides verbal cues and/or  touching/steadying and/or contact guard assistance as patient completes  activity. Assistance may be provided throughout the activity or intermittently.  Walk 150 Feet:   [place] provides verbal cues and/or touching/steadying and/or  contact guard assistance as patient completes activity. Assistance may be  provided throughout the activity or intermittently.  Walking 10 Feet on Uneven Surfaces:   [place] provides verbal cues and/or  touching/steadying and/or contact guard assistance as patient completes  activity. Assistance may be provided throughout the activity or intermittently.  1 Step Over Curb or Up/Down Stair:   [place] provides verbal cues and/or  touching/steadying  and/or contact guard assistance as patient completes  activity. Assistance may be provided throughout the activity or intermittently.  4 Steps Up and Down, With/Without Rail:   [place] provides verbal cues and/or  touching/steadying and/or contact guard assistance as patient completes  activity. Assistance may be provided throughout the activity or intermittently.  12 Steps Up and Down, With/Without Rail:   [place] provides verbal cues and/or  touching/steadying and/or contact guard assistance as patient completes  activity. Assistance may be provided throughout the activity or intermittently.  Picking up an Object:   [place] provides verbal cues and/or touching/steadying  and/or contact guard assistance as patient completes activity. Assistance may be  provided throughout the activity or intermittently. Uses Wheelchair and/or  Scooter: No    Section J. Health Conditions (Pain):  Pain Interference with Therapy Activities:   Rarely or not at all  Pain Interference with Day-to-Day Activities:   Rarely or not at all    Signed by: Anita Boswell, PT

## 2023-09-01 NOTE — PROGRESS NOTES
Discussed patient's current status with PT, OT, ST, and Dr. Joya. Patient doing very well with mobility and ADL's. ST reports patient does have some difficulty with attention and mild memory impairment. Also has some difficulty with visual spatial tasks. ST did medication management task with patient and he was 90% accurate. Patient would like to go home and team is in agreement with patient being d/c today. Son, Seth, here and went to afternoon therapy sessions with patient. No further PT or OT recommended. ST does recommend continued ST and patient is agreeable to this. Discussed options for OP therapy. Patient and son prefer he come back here to [facility] so patient scheduled to start outpatient ST on Wednesday,  at 9:30 a.m. Discussed and gave schedule to patient and son. OT recommended patient use shower chair at home. Patient stated he has one in his basement and son will put in his shower. Patient's wife not able to be here today due to being out of town for her brother's . Son has been keeping her informed of plan to go home today. Son will transport patient home and be with him until wife gets home. Daughter and sons will provide intermittent assist. Son, Seth, plans to take FMLA on intermittent basis. Will assist with any further plans.     Called patient's PCP office for f/u appointment. Dr. Isabel out on medical leave currently. Patient had seen Dr. Lambert in this office while his PCP is out but patient does not want to see her and requests to see different provider. Since he doesn't want to see her, they will have to put him on cancellation list for another provider and will call him when they have cancellation and can get him in.

## 2023-09-06 ENCOUNTER — HOSPITAL ENCOUNTER (OUTPATIENT)
Dept: SPEECH THERAPY | Facility: HOSPITAL | Age: 83
Setting detail: THERAPIES SERIES
Discharge: HOME | End: 2023-09-06
Payer: MEDICARE

## 2023-09-06 DIAGNOSIS — R47.01 APHASIA: ICD-10-CM

## 2023-09-06 DIAGNOSIS — I63.512 ACUTE ISCHEMIC LEFT MCA STROKE: Primary | ICD-10-CM

## 2023-09-06 PROCEDURE — 96105 ASSESSMENT OF APHASIA: CPT | Performed by: SPEECH-LANGUAGE PATHOLOGIST

## 2023-09-06 NOTE — THERAPY EVALUATION
Outpatient Speech Language Pathology   Adult Speech Language Cognitive Initial Evaluation  [facility]     Patient Name: Colin Connor  : 1940  MRN: [id number]  Today's Date: 2023        Visit Date: 2023   Patient Active Problem List   Diagnosis    Acute ischemic left MCA stroke        Past Medical History:   Diagnosis Date    CHF (congestive heart failure)     Coronary artery disease     Elevated cholesterol     Hypertension     Sleep apnea     Stroke         Past Surgical History:   Procedure Laterality Date    CARDIAC CATHETERIZATION      CARDIAC SURGERY      EYE SURGERY           Visit Dx:    ICD-10-CM ICD-9-CM   1. Acute ischemic left MCA stroke  I63.512 434.91   2. Aphasia  R47.01 784.3            OP SLP Assessment/Plan - 23 1238          SLP Assessment    Functional Problems Speech Language- Adult/Cognition  -KA    Impact on Function: Adult Speech Language/Cognition Trouble learning or remembering new information;Difficulty participating in avocational activities;Lack of insight or awareness of deficits, safety issues  -KA    Clinical Impression: Speech Language-Adult/Congnition Mild-Moderate:;Cognitive Communication Impairment  -KA    Please refer to paper survey for additional self-reported information Yes  -KA    Please refer to items scanned into chart for additional diagnostic informaiton and handouts as provided by clinician Yes  -KA    Prognosis Good (comment)  -KA    Patient/caregiver participated in establishment of treatment plan and goals Yes  -KA    Patient would benefit from skilled therapy intervention Yes  -KA       SLP Plan    Frequency 1x a week  -KA    Duration 4 to 8 weeks  -KA    Planned CPT's? SLP DEV COG SKILLS INITIAL (15 MIN) : 10706;SLP DEV COG SKILLS ADD (15 MIN) : 68109;SLP INDIVIDUAL SPEECH THERAPY: 58900  -KA    Expected Duration of Therapy Session (SLP Eval) 45  -KA              User Key  (r) = Recorded By, (t) = Taken By, (c) = Cosigned By       Initials Name Provider Type    KA Demetrius Omer MA,CCC-SLP Speech and Language Pathologist                     SLP SLC Evaluation - 09/06/23 0900          Communication Assessment/Intervention    Document Type evaluation  -KA    Subjective Information no complaints  -KA    Patient Observations alert;cooperative  -KA    Patient Effort good  -KA    Symptoms Noted During/After Treatment none  -KA       General Information    Patient Profile Reviewed yes  -KA    Pertinent History Of Current Problem Patient s/p recent left frontal CVA with right sided weakness and aphasia on 8/23/23 and right facial droop. 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 Patient underwent mechanical thrombectomy and tPA. Patient referred for outpatient speech therapy secondary to recent cognitive evaluation revealing moderate impairments.  -KA    Precautions/Limitations, Vision WFL with corrective lenses  -KA    Precautions/Limitations, Hearing hearing impairment, bilaterally  -KA    Prior Level of Function-Communication WFL;other (see comments)   hx of CVA, pt denies premorbid deficits -KA    Plans/Goals Discussed with patient  -KA    Barriers to Rehab none identified  -KA    Patient's Goals for Discharge functional communication;other (see comments)   return to driving -KA    Standardized Assessment Used Western Aphasia Battery  -KA       Standardized Tests    Formal Speech Language Tests WAB: Western Aphasia Battery  -KA    Cognitive/Memory Tests CLQT: Cognitive Linguistic Quick Test  -KA       Spontaneous Speech    Information Content 10  -KA    Fluency 9  -KA    Spontaneous Speech Total 19  -KA       Comprehension    Yes/No Questions 60  -KA    Auditory Word Recongition 60  -KA    Sequential Commands 80  -KA    Comprehension Total 200  -KA       Repetition    Repetition Total 96  -KA       Naming    Object 57  -KA    Word Fluency 8  -KA    Sentence Completion 10  -KA    Responsive  Speech 10  -KA    Naming Total 85  -       Aphasia    Aphasia Quotient 94.2  -    Aphasia Severity Very Mild (>90)  -       Cortical    WAB Comments Overall patient demonstrates functional word finding skills. Patient adequately summarizes background information. Functional word finding and summarization of picture scene, pt demonstrated x1 word finding difficulty with pause/hesitation.  Patient demonstrates functional auditory comprehension skills. Moderate impairment with thought organization naming 8 items in concrete category.  -       CLQT (The Cognitive Linguistic Quick Test)    CLQT Comments began to administer portions of CLQT after WAB. Will complete next session along with diagnostic assessment. Patient scored 8/8 in personal facts, 5/10 in symbol cancellations, 10/10 controntation naming and 6 in story retelling. In Symbol cancellation task pt began to [place] correct symbol however then circled incorrect target/symbol indicating deficits in attention. In story retelling task pt recalled 9 facts out of 18 indicating mild to moderate impairment with immediate recall.  -       SLP Evaluation Clinical Impressions    SLP Diagnosis mild-moderate;cognitive-linguistic disorder  -    Rehab Potential/Prognosis good  -[facility] Criteria for Skilled Therapy Interventions Met yes  -KA    Functional Impact difficulty completing home management task;difficulty completing vocational tasks  -KA       Recommendations    Therapy Frequency (SLP SLC) 1 day per week  -KA    Predicted Duration Therapy Intervention (Days) until discharge  -    Anticipated Discharge Disposition (SLP) home with assist  -              User Key  (r) = Recorded By, (t) = Taken By, (c) = Cosigned By      Initials Name Provider Type    Demetrius Jorge MA,CCC-SLP Speech and Language Pathologist                                    OP SLP Education       Row Name 09/06/23 1238       Education    Barriers to Learning No barriers  identified  -KA    Education Provided Described results of evaluation;Patient expressed understanding of evaluation  -KA    Assessed Learning needs;Learning motivation  -KA    Learning Motivation Strong  -KA    Learning Method Explanation  -KA    Teaching Response Verbalized understanding  -KA              User Key  (r) = Recorded By, (t) = Taken By, (c) = Cosigned By      Initials Name Effective Dates    KA Demetrius Omer MA,CCC-SLP 07/11/23 -                    SLP OP Goals       Row Name 09/06/23 1200          Goal Type Needed    Goal Type Needed Verbal Expression;Written Language Comprehension;Probelm Solving;Memory  -KA        Written Language Comprehension Goals    Written Language Comprehension LTG's Patient will be able to read and comprehend magazines and books  -KA     Patient will be able to read and comprehend magazines and books 90%:;without cues  -KA     Status: Patient will be able to read and comprehend magazines and books New  -KA     Written Language Comprehension STG's Patient will improve comprehension of written language skills by answering written questions related to home management/social/work tasks (bills, recipes, tv guide, emails, procedures)  -KA     Patient will improve comprehension of written language skills by answering written questions related to home management/social/work tasks (bills, recipes, tv guide, emails, procedures) 90%:;without cues  -KA     Status: Patient will improve comprehension of written language skills by answering written questions related to home management/social/work tasks (bills, recipes, tv guide, emails, procedures) New  -KA        Verbal Expression Goals    Verbal Expression LTG's Patient will be able to use verbal expressive language skills to communicate effectively in social/avocational/work setting  -KA     Patient will be able to use verbal expressive language skills to communicate effectively in social/avocational/work setting 90%:;without cues  -KA      Status: Patient will be able to use verbal expressive language skills to communicate effectively in social/avocational/work setting New  -KA     Verbal Expression STG's Patient will improve verbal expressive language skills by completing divergent naming tasks  -KA     Patient will improve verbal expressive language skills by completing divergent naming tasks 90%:;without cues  -KA     Status: Patient will improve verbal expressive language skills by completing divergent naming tasks New  -KA        Memory Goals    Memory LTG's Patient and family will implement compensatory strategies to maximize patient’s Memory function so patient can continue to participate in daily activities  -KA     Patient and family will implement compensatory strategies to maximize patient’s Memory function so patient can continue to participate in daily activities 90%:;without cues  -KA     Status: Patient and family will implement compensatory strategies to maximize patient’s Memory function so patient can continue to participate in daily activities New  -KA     Memory STG's Patient will demonstrate improved ability to recall information by listening to paragraph and answering yes/no questions  -KA     Patient will demonstrate improved ability to recall information by listening to paragraph and answering yes/no questions 90%:;without cues  -KA     Status: Patient will demonstrate improved ability to recall information by listening to paragraph and answering yes/no questions New  -KA               User Key  (r) = Recorded By, (t) = Taken By, (c) = Cosigned By      Initials Name Provider Type    Demetrius Jorge MA,CCC-SLP Speech and Language Pathologist                    9:15 to 9:30, 11:45 to 12:45      Time Calculation:   SLP Start Time: 0930  SLP Stop Time: 1015  SLP Time Calculation (min): 45 min  Timed Charges  79858-[place] of Aphasia: 120  Total Minutes  Timed Charges Total Minutes: 120   Total Minutes: 120    Therapy  Charges for Today       Code Description Service Date Service Provider Modifiers Qty    39718339294  ST ASSESSMENT OF APHASIA PER HOUR 9/6/2023 Demetrius Omer MA,CCC-SLP GN 2                     Demetrius Omer MA,CCC-SLP  9/6/2023

## 2023-09-08 NOTE — PROGRESS NOTES
PPS CMG Coordinator  Inpatient Rehabilitation Discharge    Mode of Locomotion: Walking.    Discharge Against Medical Advice:  No.  Discharge Information  Patient Discharged Alive:  Yes  Discharge Destination/Living Setting: Home.  At discharge, the patient was discharged to live (with) (02)  Family / Relatives    Diagnosis for Interruption/Death: ICD    Impairment Group: Stroke: 01.4 No Paresis    Comorbidities: ICD    Complications: ICD    QUALITY INDICATORS  Section A. Medication List  Medication List to Subsequent Provider:  Not applicable.  Patient was not  discharged to a subsequent provider.  Discharge Location:  01 - Home  Medication List to Patient at Discharge:  Yes - Current reconciled medication  list provided to the patient, family and/or caregiver  Route(s) of Medication List Transmission to Patient:  Electronic Health Record,  Verbal (e.g., in-person, telephone, video conferencing), Paper-based (e.g., fax,  copies, printouts)    Section J Health Conditions: Fall(s) Since Admission:  No    Section K. Swallowing/Nutritional Status  Nutritional Approaches Past 7 Days:   None  Nutritional Approaches at Discharge:  None    Section M. Skin Conditions Discharge:  Unhealed Pressure Ulcer(s) at Stage 1 or  Higher:  No    . Current Number of Unhealed Pressure Ulcers  Branch    Section N. Medication:  Medication Intervention: Not applicable - There were no potential clinically  significant medication issues identified since admission or patient is not  taking any medications.  Section . High-Risk Drug Classes: Use and Indication                       Is Taking                    Indication noted  High-RiskDrug Class  I. Antiplatelet      Yes                          Yes    Section O. Special Treatments, Procedures, and Programs  None    Signed by: Maria G Grace RN

## 2023-09-08 NOTE — PROGRESS NOTES
PPS CMG Coordinator  Inpatient Rehabilitation Admission    Ethnic Group: White.  Marital Status:  Marital Status: .    IRF Admission Date:  08/30/2023  Admission Class: Initial Rehab.  Admit From:  [facility]    Pre-Hospital Living: Home. Pre-Hospital Living  With: (2) Family/Relatives.    Payment Sources: Primary: Medicare Fee for Service  Secondary: Not Listed.  Impairment Group: 01.4 No Paresis  Date of Onset of Impairment: 08/23/2023    Etiologic Diagnosis Code(s):  Rank Code      Description  1    I63.9     Cerebral infarction, unspecified    Comorbidities:  ICD    Are there any arthritis conditions recorded for Impairment Group, Etiologic  Diagnosis, or Comorbid Conditions that meet all of the regulatory requirements  for IRF classification (in 42 .29(b)(2)(x), (xi), and xii))? No    Presence of Pressure Ulcer:  No observed/documented pressure ulcers.    MEDICAL NEEDS  Height on Admission:  66 inches.  Weight on Admission:  192 pounds.    QUALITY INDICATORS  Prior Functioning:  Self Care: Patient completed all the activities by themself, with or without an  assistive device, with no assistance from a helper.  Indoor Mobility: Patient completed the activities by themself, with or without  an assistive device, with no assistance from a helper.  Stairs: Patient completed the activities by themself, with or without an  assistive device, with no assistance from a helper.  Functional Cognition: Patient completed the activities by themself, with or  without an assistive device, with no assistance from a helper.  Prior Device Use: Patient does not use manual or motorized wheelchair or  scooter, mechanical lift, walker, or an orthotic/prosthesis.    Bladder and Bowel: Bladder Continence: Incontinent less than daily (e.g., once  or twice during the 3-day assessment period).  Bowel Continence: Not rated (patient had an ostomy or did not have a bowel  movement for the entire 3  days).  Swallowing/Nutritional Status: Regular food (solids and liquids swallowed safely  without supervision or modified food or liquid consistency).  Special Conditions: Patient did not receive total parenteral nutrition treatment  at the time of admission.  Section A. Ethnicity/ Race/Language  Ethnicity: Not of , /a, Chinese Origin  Race: White  Preferred Language: English  Requests  to Communicate:   No    Section I. Active Diagnosis: Comorbidities and Co-existing Conditions:   Patient  does not have PAD, PVD, or Diabetes Mellitus  Section J. Health Conditions: Patient has not had any falls in the past year.  Patient has had major surgery during the 100 days prior to admission.  Section K. Swallowing/Nutritional Status  Nutritional Approaches on Admission:  Nutritional Approaches on Admission:  None  Section M. Skin Conditions  Unhealed Pressure Ulcer/Injuries at Stage 1 or  Higher on Admission:  No.  Section N. Medication:  Potential Clinically Significant Medication Issues: No issues found during  review  Section . High-Risk Drug Classes: Use and Indication                       Is Taking                    Indication noted  High-RiskDrug Class  I. Antiplatelet      Yes                          Yes    Section O. Special Treatments, Procedures, and Programs  None    Signed by: Maria G Grace RN

## 2023-09-13 ENCOUNTER — HOSPITAL ENCOUNTER (OUTPATIENT)
Dept: SPEECH THERAPY | Facility: HOSPITAL | Age: 83
Setting detail: THERAPIES SERIES
Discharge: HOME | End: 2023-09-13
Payer: MEDICARE

## 2023-09-13 DIAGNOSIS — I63.512 ACUTE ISCHEMIC LEFT MCA STROKE: Primary | ICD-10-CM

## 2023-09-13 DIAGNOSIS — R47.01 APHASIA: ICD-10-CM

## 2023-09-13 PROCEDURE — 97130 THER IVNTJ EA ADDL 15 MIN: CPT | Performed by: SPEECH-LANGUAGE PATHOLOGIST

## 2023-09-13 PROCEDURE — 97129 THER IVNTJ 1ST 15 MIN: CPT | Performed by: SPEECH-LANGUAGE PATHOLOGIST

## 2023-09-13 NOTE — THERAPY TREATMENT NOTE
Outpatient Speech Language Pathology   Adult Speech Language Cognitive Treatment Note  [facility]     Patient Name: Colin Connor  : 1940  MRN: [id number]  Today's Date: 2023         Visit Date: 2023   Patient Active Problem List   Diagnosis    Acute ischemic left MCA stroke          Visit Dx:    ICD-10-CM ICD-9-CM   1. Acute ischemic left MCA stroke  I63.512 434.91   2. Aphasia  R47.01 784.3            SLP SLC Evaluation - 23 1200          CLQT (The Cognitive Linguistic Quick Test)    Attention Domain Score 121  -KA    Attention Severity Rating 3: Mild  -KA    Memory Domain Score 145  -KA    Memory Severity Rating 4: WNL  -KA    Executive Function Domain Score 25  -KA    Executive Function Severity Rating 4: WNL  -KA    Language Domain Score 27  -KA    Language Severity Rating 3: Mild  -KA    Visuospatial Domain Score 72  -KA    Visuospatial Severity Rating 4: WNL  -KA    Clock Drawing Total Score 11  -KA    Clock Drawing Severity Rating WNL  -KA    Composite Severity Rating 3.6  -KA    Composite Severity Rating Range 4.0 - 3.5: WNL  -KA    CLQT Comments SLP completed adminstration of CLQT. Patient scored below the criterion cut off scores in symbol cancellation and generative naming indicating deficits in attention and language. During diagnostic assessment of cognitive skills, delayed recall of three unrelated words after 8 minute delay 2/3 66% without cues. Answering questions on calendar requiring attention to detail. Patient required max explanation of task and assistance with interpretion of information in calendar. Patient answered questions with 60% after education and cueing. Patient feels he is at baseline and denies cognitive deficits. SLP does question patients baseline cognitive status. SLP recommends short term therapy to address patients cognitive skills and will recommend additional therapy based on patient participation and progress of therapy. Patient is agreeable  with plan and 4 weeks of therapy.  -SHAD              User Key  (r) = Recorded By, (t) = Taken By, (c) = Cosigned By      Initials Name Provider Type    Demetrius Jorge MA,CCC-SLP Speech and Language Pathologist                                             Time Calculation:   SLP Start Time: 0930  SLP Stop Time: 1015  SLP Time Calculation (min): 45 min  Timed Charges  74623-YG Dev of Cogn Skills Initial Minutes: 15  33333-EI Dev of Cogn Skills Add Minutes: 30  Total Minutes  Timed Charges Total Minutes: 45   Total Minutes: 45    Therapy Charges for Today       Code Description Service Date Service Provider Modifiers Qty    34451909240 HC ST DEV OF COGN SKILLS INITIAL 15 MIN 9/13/2023 Demetrius Omer MA,CCC-SLP  1    04697044267 HC ST DEV OF COGN SKILLS EACH ADDT'L 15 MIN 9/13/2023 Demetrius Omer MA,CCC-SLP  2                     Demetrius Omer MA,CCC-SLP  9/13/2023

## 2023-09-20 ENCOUNTER — HOSPITAL ENCOUNTER (OUTPATIENT)
Dept: SPEECH THERAPY | Facility: HOSPITAL | Age: 83
Setting detail: THERAPIES SERIES
Discharge: HOME | End: 2023-09-20
Payer: MEDICARE

## 2023-09-20 DIAGNOSIS — R47.01 APHASIA: ICD-10-CM

## 2023-09-20 DIAGNOSIS — I63.512 ACUTE ISCHEMIC LEFT MCA STROKE: Primary | ICD-10-CM

## 2023-09-20 PROCEDURE — 97130 THER IVNTJ EA ADDL 15 MIN: CPT | Performed by: SPEECH-LANGUAGE PATHOLOGIST

## 2023-09-20 PROCEDURE — 97129 THER IVNTJ 1ST 15 MIN: CPT | Performed by: SPEECH-LANGUAGE PATHOLOGIST

## 2023-09-20 NOTE — THERAPY DISCHARGE NOTE
Outpatient Speech Language Pathology   Adult Speech Language Cognitive Treatment Note/Discharge Summary  [facility]     Patient Name: Colin Connor  : 1940  MRN: [id number]  Today's Date: 2023         Visit Date: 2023   Patient Active Problem List   Diagnosis    Acute ischemic left MCA stroke          Visit Dx:    ICD-10-CM ICD-9-CM   1. Acute ischemic left MCA stroke  I63.512 434.91   2. Aphasia  R47.01 784.3                            SLP OP Goals       Row Name 23 1000          Subjective Comments    Subjective Comments Patient was presented with schedule task today requiring him to interpret the written information, including following directions and writing the items in correct order. Patient voiced he quit school in Highschool, stopped when 16 and he has never completed a schedule task before. SLP provided functional examples of tasks involving following written directions. Overall pt feels certain he is performing at his baseline and requests today to be last day. Patient did perform well today on math and other tasks see detail below.  -KA        Written Language Comprehension Goals    Status: Patient will be able to read and comprehend magazines and books Discontinued  -KA     Patient will improve comprehension of written language skills by answering written questions related to home management/social/work tasks (bills, recipes, tv guide, emails, procedures) 90%:;without cues  -KA     Status: Patient will improve comprehension of written language skills by answering written questions related to home management/social/work tasks (bills, recipes, tv guide, emails, procedures) --  partially met  -KA     Comments: Patient will improve comprehension of written language skills by answering written questions related to home management/social/work tasks (bills, recipes, tv guide, emails, procedures) Attempted a simple schedule task requiring following directions, pt completed 60%  without cues, omitted steps at times. Answering questions on calendar 88% without cues. For homework pt answered questions on flight times incorporating simple math skills 83% without cues.  -KA        Verbal Expression Goals    Status: Patient will be able to use verbal expressive language skills to communicate effectively in social/avocational/work setting Discontinued  -KA     Status: Patient will improve verbal expressive language skills by completing divergent naming tasks Discontinued  -KA        Memory Goals    Status: Patient and family will implement compensatory strategies to maximize patient’s Memory function so patient can continue to participate in daily activities --  partially achieved  -KA     Status: Patient will demonstrate improved ability to recall information by listening to paragraph and answering yes/no questions --  not met  -KA     Comments: Patient will demonstrate improved ability to recall information by listening to paragraph and answering yes/no questions Patient recalled detail at the paragraph level (multiple repetitions) 90% without cues. Delayed recall of three related words with education on visualization and repetition 2/3 66% without cues after 10 minute delay and 3/3 100% without cues after another 15 minute delay.  -KA        Problem Solving Goals    Problem Solving LTG's Patient will be able to interact safely in home environment  -KA     Patient will be able to interact safely in home environment Independently  -KA     Status: Patient will be able to interact safely in home environment Achieved  -KA     Problem Solving STG's Patient will improve ability to analyze problems and determine solutions by completing functional math problems  -KA     Patient will improve ability to analyze problems and determine solutions by completing functional math problems 90%:;without cues  -KA     Status: Patient will improve ability to analyze problems and determine solutions by completing  functional math problems Achieved  -SHAD     Comments: Patient will improve ability to analyze problems and determine solutions by completing functional math problems Simple functional math word problems 90% without cues.  -SHAD               User Key  (r) = Recorded By, (t) = Taken By, (c) = Cosigned By      Initials Name Provider Type    Demetrius Jorge MA,CCC-SLP Speech and Language Pathologist                                 Time Calculation:   SLP Start Time: 0930  SLP Stop Time: 1015  SLP Time Calculation (min): 45 min  Timed Charges  58758-PF Dev of Cogn Skills Initial Minutes: 15  85011-JV Dev of Cogn Skills Add Minutes: 30  Total Minutes  Timed Charges Total Minutes: 45   Total Minutes: 45    Therapy Charges for Today       Code Description Service Date Service Provider Modifiers Qty    35713080487 HC ST DEV OF COGN SKILLS INITIAL 15 MIN 9/20/2023 Demetrius Omer MA,CCC-SLP  1    22773920942 HC ST DEV OF COGN SKILLS EACH ADDT'L 15 MIN 9/20/2023 Demetrius Omer MA,CCC-SLP  2                   OP SLP Discharge Summary  Date of Discharge: 09/20/23  Reason for Discharge: patient/family request discontinuation of services  Discharge Destination: home w/ assist  Discharge Instructions: Patient feels certain he is performing at his baseline, see note for detail and requested discharge today      Demetrius Omer MA,CCC-SLP  9/20/2023

## 2023-09-27 ENCOUNTER — APPOINTMENT (OUTPATIENT)
Dept: SPEECH THERAPY | Facility: HOSPITAL | Age: 83
End: 2023-09-27
Payer: MEDICARE